# Patient Record
Sex: FEMALE | Race: WHITE | NOT HISPANIC OR LATINO | URBAN - METROPOLITAN AREA
[De-identification: names, ages, dates, MRNs, and addresses within clinical notes are randomized per-mention and may not be internally consistent; named-entity substitution may affect disease eponyms.]

---

## 2018-11-18 ENCOUNTER — EMERGENCY (EMERGENCY)
Facility: HOSPITAL | Age: 68
LOS: 1 days | Discharge: HOME | End: 2018-11-18
Attending: EMERGENCY MEDICINE
Payer: MEDICARE

## 2018-11-18 VITALS
OXYGEN SATURATION: 100 % | DIASTOLIC BLOOD PRESSURE: 96 MMHG | TEMPERATURE: 98 F | RESPIRATION RATE: 17 BRPM | SYSTOLIC BLOOD PRESSURE: 171 MMHG | HEART RATE: 89 BPM | WEIGHT: 145.06 LBS

## 2018-11-18 VITALS
RESPIRATION RATE: 17 BRPM | SYSTOLIC BLOOD PRESSURE: 165 MMHG | OXYGEN SATURATION: 100 % | HEART RATE: 89 BPM | DIASTOLIC BLOOD PRESSURE: 83 MMHG | TEMPERATURE: 97 F

## 2018-11-18 LAB
ALBUMIN SERPL ELPH-MCNC: 3.8 G/DL — SIGNIFICANT CHANGE UP (ref 3.5–5)
ALP SERPL-CCNC: 49 U/L — SIGNIFICANT CHANGE UP (ref 40–120)
ALT FLD-CCNC: 38 U/L DA — SIGNIFICANT CHANGE UP (ref 10–60)
ANION GAP SERPL CALC-SCNC: 8 MMOL/L — SIGNIFICANT CHANGE UP (ref 5–17)
AST SERPL-CCNC: 15 U/L — SIGNIFICANT CHANGE UP (ref 10–40)
BILIRUB SERPL-MCNC: 0.2 MG/DL — SIGNIFICANT CHANGE UP (ref 0.2–1.2)
BUN SERPL-MCNC: 10 MG/DL — SIGNIFICANT CHANGE UP (ref 7–18)
CALCIUM SERPL-MCNC: 8.4 MG/DL — SIGNIFICANT CHANGE UP (ref 8.4–10.5)
CHLORIDE SERPL-SCNC: 98 MMOL/L — SIGNIFICANT CHANGE UP (ref 96–108)
CO2 SERPL-SCNC: 24 MMOL/L — SIGNIFICANT CHANGE UP (ref 22–31)
CREAT SERPL-MCNC: 0.67 MG/DL — SIGNIFICANT CHANGE UP (ref 0.5–1.3)
GLUCOSE SERPL-MCNC: 133 MG/DL — HIGH (ref 70–99)
HCT VFR BLD CALC: 37.9 % — SIGNIFICANT CHANGE UP (ref 34.5–45)
HGB BLD-MCNC: 12.9 G/DL — SIGNIFICANT CHANGE UP (ref 11.5–15.5)
MCHC RBC-ENTMCNC: 33.2 PG — SIGNIFICANT CHANGE UP (ref 27–34)
MCHC RBC-ENTMCNC: 34.1 GM/DL — SIGNIFICANT CHANGE UP (ref 32–36)
MCV RBC AUTO: 97.5 FL — SIGNIFICANT CHANGE UP (ref 80–100)
PLATELET # BLD AUTO: 418 K/UL — HIGH (ref 150–400)
POTASSIUM SERPL-MCNC: 4.1 MMOL/L — SIGNIFICANT CHANGE UP (ref 3.5–5.3)
POTASSIUM SERPL-SCNC: 4.1 MMOL/L — SIGNIFICANT CHANGE UP (ref 3.5–5.3)
PROT SERPL-MCNC: 6.6 G/DL — SIGNIFICANT CHANGE UP (ref 6–8.3)
RBC # BLD: 3.89 M/UL — SIGNIFICANT CHANGE UP (ref 3.8–5.2)
RBC # FLD: 11.3 % — SIGNIFICANT CHANGE UP (ref 10.3–14.5)
SODIUM SERPL-SCNC: 130 MMOL/L — LOW (ref 135–145)
TROPONIN I SERPL-MCNC: <0.015 NG/ML — SIGNIFICANT CHANGE UP (ref 0–0.04)
WBC # BLD: 13.7 K/UL — HIGH (ref 3.8–10.5)
WBC # FLD AUTO: 13.7 K/UL — HIGH (ref 3.8–10.5)

## 2018-11-18 PROCEDURE — 80053 COMPREHEN METABOLIC PANEL: CPT

## 2018-11-18 PROCEDURE — 99284 EMERGENCY DEPT VISIT MOD MDM: CPT | Mod: 25

## 2018-11-18 PROCEDURE — 71046 X-RAY EXAM CHEST 2 VIEWS: CPT | Mod: 26

## 2018-11-18 PROCEDURE — 93005 ELECTROCARDIOGRAM TRACING: CPT

## 2018-11-18 PROCEDURE — 85027 COMPLETE CBC AUTOMATED: CPT

## 2018-11-18 PROCEDURE — 71046 X-RAY EXAM CHEST 2 VIEWS: CPT

## 2018-11-18 PROCEDURE — 96374 THER/PROPH/DIAG INJ IV PUSH: CPT

## 2018-11-18 PROCEDURE — 84484 ASSAY OF TROPONIN QUANT: CPT

## 2018-11-18 PROCEDURE — 94640 AIRWAY INHALATION TREATMENT: CPT

## 2018-11-18 PROCEDURE — 99284 EMERGENCY DEPT VISIT MOD MDM: CPT

## 2018-11-18 RX ORDER — IPRATROPIUM/ALBUTEROL SULFATE 18-103MCG
3 AEROSOL WITH ADAPTER (GRAM) INHALATION ONCE
Qty: 0 | Refills: 0 | Status: COMPLETED | OUTPATIENT
Start: 2018-11-18 | End: 2018-11-18

## 2018-11-18 RX ORDER — NYSTATIN 500MM UNIT
4 POWDER (EA) MISCELLANEOUS
Qty: 240 | Refills: 0
Start: 2018-11-18

## 2018-11-18 RX ORDER — MAGNESIUM SULFATE 500 MG/ML
2 VIAL (ML) INJECTION ONCE
Qty: 0 | Refills: 0 | Status: COMPLETED | OUTPATIENT
Start: 2018-11-18 | End: 2018-11-18

## 2018-11-18 RX ORDER — EPINEPHRINE 0.3 MG/.3ML
0.3 INJECTION INTRAMUSCULAR; SUBCUTANEOUS ONCE
Qty: 0 | Refills: 0 | Status: COMPLETED | OUTPATIENT
Start: 2018-11-18 | End: 2018-11-18

## 2018-11-18 RX ORDER — ALBUTEROL 90 UG/1
2 AEROSOL, METERED ORAL
Qty: 1 | Refills: 0 | OUTPATIENT
Start: 2018-11-18

## 2018-11-18 RX ADMIN — Medication 3 MILLILITER(S): at 16:55

## 2018-11-18 RX ADMIN — EPINEPHRINE 0.3 MILLIGRAM(S): 0.3 INJECTION INTRAMUSCULAR; SUBCUTANEOUS at 17:54

## 2018-11-18 RX ADMIN — Medication 2 GRAM(S): at 15:15

## 2018-11-18 RX ADMIN — Medication 3 MILLILITER(S): at 16:53

## 2018-11-18 RX ADMIN — Medication 3 MILLILITER(S): at 15:13

## 2018-11-18 RX ADMIN — Medication 50 GRAM(S): at 15:12

## 2018-11-18 NOTE — ED ADULT TRIAGE NOTE - CHIEF COMPLAINT QUOTE
shortness of breath x 10 days. atient completed ABX. on prednisone medication, (+) wheezing noted in triage

## 2018-11-18 NOTE — ED PROVIDER NOTE - MEDICAL DECISION MAKING DETAILS
69 yo f w/ aforementioned presentation concerning for asthma exacerbation but will r/o PNA and fluid overload w/ labs and CXR and treat w/ steroids, magnesium, duonebs and reassess.  Pt attempted to leave AMA but still tachypneic w/ wheezing. Pt refuses to be admitted to the hospital for status asthmaticus. After discussion with the patient she reports that in the past epinephrine usually resolves her symptoms and she insists that amoxicillin is also a reliable treatment for her bronchitis instead of the antibiotic she was prescribed at [facility]. Pt agrees to stay for monitoring after epinephrine and if she has not improved she will reconsider admission.  Pt reassessed and lungs clear to auscultation b/l w/ no further wheezing. Pt improved for dc home and instructions to follow up with her PMD.

## 2018-11-18 NOTE — ED PROVIDER NOTE - OBJECTIVE STATEMENT
69 yo f w/ PMH HTN and lifelong asthmatic c/o dyspnea x 3 days. Pt reports she was diagnosed w/ bronchitis 2 weeks ago at [facility] for which she was prescribed prednisone and an unknown antibiotic. Her cough persisted and since Friday she developed worsened dyspnea similar to prior asthma exacerbations. She denies any CP, LE swelling or pain, recent travel, history of PE/DVT, f/c/ns, or any other complaints.

## 2018-11-18 NOTE — ED PROVIDER NOTE - PHYSICAL EXAMINATION
General: pt lying in stretcher, appears stated age and is tachypneic but speaking in full clear sentences  HEENT: AT/NC, pink conjunctiva, anicteric sclerae, EOMI, PERRLA, nasal mucosa pink, no discharge, turbinates not enlarged; moist mucus membranes, tongue well-papillated, good dentition; posterior pharynx shows no erythema or exudates;   Neck: supple, full ROM, trachea midline, no JVD, no cervical LAD, no midline ttp or stepoffs  Lungs: b/l wheezes, no crackles, or rhonchi  Heart: rrr, S1, S2 normal; no S3 or S4; no murmurs or rubs  Abd: normal bowel sounds; soft, nontender; negative McBurney's point tenderness, negative Payton's sign, no rebound, no guarding, spleen non-palpable; no hepatomegaly, no masses  Back: no midline spinal tenderness or stepoffs, no costovertebral angle tenderness  Extremities: no clubbing, cyanosis, or edema; no palpable deformities or fractures, no calf tenderness, negative Carlos A's sign  Skin: good turgor; no rashes, petechiae, ecchymoses, or jaundice  Pulses: radial, posterior tibialis (PT), dorsalis pedis (DP) all 2+ & symmetric  Neuro: awake, alert, responsive; oriented to person, place and time; cranial nerves intact, EOMI, intact jaw movement, intact facial sensation, no facial asymmetry, hearing intact; no nystagmus, tongue midline; Motor: Normal tone in upper and lower extremities bilaterally strength 5/5; Sensory: intact; normal steady gait

## 2019-02-27 NOTE — ED ADULT NURSE NOTE - CHIEF COMPLAINT
In order to meet Medicare requirements, the clinical documentation must support the information cited in the admission order.  Please be sure to provide detailed and clear documentation about the following in the admitting note/history and physical: The patient is a 68y Female complaining of shortness of breath.

## 2019-05-27 ENCOUNTER — EMERGENCY (EMERGENCY)
Facility: HOSPITAL | Age: 69
LOS: 1 days | Discharge: HOME | End: 2019-05-27
Attending: EMERGENCY MEDICINE
Payer: MEDICARE

## 2019-05-27 VITALS
DIASTOLIC BLOOD PRESSURE: 80 MMHG | RESPIRATION RATE: 17 BRPM | SYSTOLIC BLOOD PRESSURE: 164 MMHG | OXYGEN SATURATION: 99 % | HEART RATE: 101 BPM

## 2019-05-27 VITALS
OXYGEN SATURATION: 98 % | RESPIRATION RATE: 18 BRPM | TEMPERATURE: 98 F | SYSTOLIC BLOOD PRESSURE: 166 MMHG | WEIGHT: 149.91 LBS | HEART RATE: 92 BPM | DIASTOLIC BLOOD PRESSURE: 92 MMHG | HEIGHT: 65 IN

## 2019-05-27 PROBLEM — J45.901 UNSPECIFIED ASTHMA WITH (ACUTE) EXACERBATION: Chronic | Status: ACTIVE | Noted: 2018-12-06

## 2019-05-27 PROCEDURE — 73610 X-RAY EXAM OF ANKLE: CPT | Mod: 26,LT

## 2019-05-27 PROCEDURE — 99283 EMERGENCY DEPT VISIT LOW MDM: CPT | Mod: 25

## 2019-05-27 PROCEDURE — 73590 X-RAY EXAM OF LOWER LEG: CPT | Mod: 26,LT

## 2019-05-27 PROCEDURE — 29515 APPLICATION SHORT LEG SPLINT: CPT | Mod: LT

## 2019-05-27 RX ORDER — IBUPROFEN 200 MG
600 TABLET ORAL ONCE
Refills: 0 | Status: COMPLETED | OUTPATIENT
Start: 2019-05-27 | End: 2019-05-27

## 2019-05-27 NOTE — ED PROVIDER NOTE - PHYSICAL EXAMINATION
MSK: left ankle ttp, bruising over medial and lateral maleoli.   cap refill of toes wnl. normal pedal pulses.

## 2019-05-27 NOTE — ED PROVIDER NOTE - PMH
Hypertension    Moderate asthma with acute exacerbation, unspecified whether persistent    Trigeminal neuralgia

## 2019-05-27 NOTE — ED PROVIDER NOTE - RESPIRATORY NEGATIVE STATEMENT, MLM
no chest pain, no cough, and no shortness of breath. Alternatives Discussed Intro (Do Not Add Period): I discussed alternative treatments to Mohs surgery and specifically discussed the risks and benefits of

## 2019-05-27 NOTE — ED ADULT NURSE REASSESSMENT NOTE - NS ED NURSE REASSESS COMMENT FT1
Pt seen and evaluated by  DR Garcia advised to stay in hospital, however Pt not willing to be admitted, elías provided instructions given Pt verbalized understanding

## 2019-05-27 NOTE — ED ADULT NURSE NOTE - OBJECTIVE STATEMENT
presented S/P fall c/o  Lt ankle pain and swelling BIB EMS  S/P fall c/o  Lt ankle pain and swelling and deformity no LOC noted

## 2019-05-27 NOTE — ED ADULT TRIAGE NOTE - NSWEIGHTCALCTOOLDRUG_GEN_A_CORE
DATE:  11/8/2018   TIME OF RECEIPT FROM LAB:  9744  LAB TEST:  Glucose  LAB VALUE:  49   RESULTS GIVEN WITH READ-BACK TO (PROVIDER): Jane BAJWA LAB VALUE REPORTED TO PROVIDER:   1477      used

## 2019-05-27 NOTE — ED PROVIDER NOTE - PROGRESS NOTE DETAILS
jaden fx, attempted multiple times to reach Dr. Figueroa, cell phone giving busy tone, no ortho pa on duty due to holiday. splinted. pain controlled. jaden aleman, discussed findings with pt. splinted with both saddle and posterior splint. gave and showed how to use crutches. pt not in pain now. advised to admit but pt insists on going home because she left her dog in a crate.

## 2019-05-27 NOTE — ED PROVIDER NOTE - OBJECTIVE STATEMENT
69F with h/o HTN, trigeminal neuralgia, was walking down steps at 2 pm when she twisted her left ankle and fell. Was not able to walk. No head strike or LOC. Not taking any AC. Called EMS and was brought to the ED. Not in pain unless moves. No broken skin.

## 2019-05-27 NOTE — ED ADULT NURSE NOTE - NSIMPLEMENTINTERV_GEN_ALL_ED
Implemented All Universal Safety Interventions:  [place] to call system. Call bell, personal items and telephone within reach. Instruct patient to call for assistance. Room bathroom lighting operational. Non-slip footwear when patient is off stretcher. Physically safe environment: no spills, clutter or unnecessary equipment. Stretcher in lowest position, wheels locked, appropriate side rails in place.

## 2019-05-28 ENCOUNTER — INPATIENT (INPATIENT)
Facility: HOSPITAL | Age: 69
LOS: 4 days | Discharge: NURSING, REHAB OR LONG-TERM CARE | DRG: 494 | End: 2019-06-02
Attending: ORTHOPAEDIC SURGERY | Admitting: ORTHOPAEDIC SURGERY
Payer: MEDICARE

## 2019-05-28 VITALS
TEMPERATURE: 98 F | HEART RATE: 78 BPM | OXYGEN SATURATION: 99 % | DIASTOLIC BLOOD PRESSURE: 67 MMHG | RESPIRATION RATE: 17 BRPM | SYSTOLIC BLOOD PRESSURE: 132 MMHG

## 2019-05-28 DIAGNOSIS — G50.0 TRIGEMINAL NEURALGIA: ICD-10-CM

## 2019-05-28 DIAGNOSIS — Z29.9 ENCOUNTER FOR PROPHYLACTIC MEASURES, UNSPECIFIED: ICD-10-CM

## 2019-05-28 DIAGNOSIS — Z01.818 ENCOUNTER FOR OTHER PREPROCEDURAL EXAMINATION: ICD-10-CM

## 2019-05-28 DIAGNOSIS — S82.852A DISPLACED TRIMALLEOLAR FRACTURE OF LEFT LOWER LEG, INITIAL ENCOUNTER FOR CLOSED FRACTURE: ICD-10-CM

## 2019-05-28 DIAGNOSIS — S82.853A DISPLACED TRIMALLEOLAR FRACTURE OF UNSPECIFIED LOWER LEG, INITIAL ENCOUNTER FOR CLOSED FRACTURE: ICD-10-CM

## 2019-05-28 DIAGNOSIS — I10 ESSENTIAL (PRIMARY) HYPERTENSION: ICD-10-CM

## 2019-05-28 DIAGNOSIS — J45.909 UNSPECIFIED ASTHMA, UNCOMPLICATED: ICD-10-CM

## 2019-05-28 LAB
ANION GAP SERPL CALC-SCNC: 11 MMOL/L — SIGNIFICANT CHANGE UP (ref 5–17)
APTT BLD: 28.8 SEC — SIGNIFICANT CHANGE UP (ref 27.5–36.3)
BASOPHILS # BLD AUTO: 0.04 K/UL — SIGNIFICANT CHANGE UP (ref 0–0.2)
BASOPHILS NFR BLD AUTO: 0.3 % — SIGNIFICANT CHANGE UP (ref 0–2)
BUN SERPL-MCNC: 20 MG/DL — HIGH (ref 7–18)
CALCIUM SERPL-MCNC: 9.7 MG/DL — SIGNIFICANT CHANGE UP (ref 8.4–10.5)
CHLORIDE SERPL-SCNC: 100 MMOL/L — SIGNIFICANT CHANGE UP (ref 96–108)
CO2 SERPL-SCNC: 26 MMOL/L — SIGNIFICANT CHANGE UP (ref 22–31)
CREAT SERPL-MCNC: 0.78 MG/DL — SIGNIFICANT CHANGE UP (ref 0.5–1.3)
EOSINOPHIL # BLD AUTO: 0.07 K/UL — SIGNIFICANT CHANGE UP (ref 0–0.5)
EOSINOPHIL NFR BLD AUTO: 0.6 % — SIGNIFICANT CHANGE UP (ref 0–6)
GLUCOSE SERPL-MCNC: 82 MG/DL — SIGNIFICANT CHANGE UP (ref 70–99)
HCT VFR BLD CALC: 40.7 % — SIGNIFICANT CHANGE UP (ref 34.5–45)
HGB BLD-MCNC: 13.4 G/DL — SIGNIFICANT CHANGE UP (ref 11.5–15.5)
IMM GRANULOCYTES NFR BLD AUTO: 2 % — HIGH (ref 0–1.5)
INR BLD: 1.15 RATIO — SIGNIFICANT CHANGE UP (ref 0.88–1.16)
LYMPHOCYTES # BLD AUTO: 19.2 % — SIGNIFICANT CHANGE UP (ref 13–44)
LYMPHOCYTES # BLD AUTO: 2.39 K/UL — SIGNIFICANT CHANGE UP (ref 1–3.3)
MCHC RBC-ENTMCNC: 32.9 GM/DL — SIGNIFICANT CHANGE UP (ref 32–36)
MCHC RBC-ENTMCNC: 33.2 PG — SIGNIFICANT CHANGE UP (ref 27–34)
MCV RBC AUTO: 100.7 FL — HIGH (ref 80–100)
MONOCYTES # BLD AUTO: 1.01 K/UL — HIGH (ref 0–0.9)
MONOCYTES NFR BLD AUTO: 8.1 % — SIGNIFICANT CHANGE UP (ref 2–14)
NEUTROPHILS # BLD AUTO: 8.69 K/UL — HIGH (ref 1.8–7.4)
NEUTROPHILS NFR BLD AUTO: 69.8 % — SIGNIFICANT CHANGE UP (ref 43–77)
NRBC # BLD: 0 /100 WBCS — SIGNIFICANT CHANGE UP (ref 0–0)
PLATELET # BLD AUTO: 399 K/UL — SIGNIFICANT CHANGE UP (ref 150–400)
POTASSIUM SERPL-MCNC: 3.3 MMOL/L — LOW (ref 3.5–5.3)
POTASSIUM SERPL-SCNC: 3.3 MMOL/L — LOW (ref 3.5–5.3)
PROTHROM AB SERPL-ACNC: 12.8 SEC — SIGNIFICANT CHANGE UP (ref 10–12.9)
RBC # BLD: 4.04 M/UL — SIGNIFICANT CHANGE UP (ref 3.8–5.2)
RBC # FLD: 14.9 % — HIGH (ref 10.3–14.5)
SODIUM SERPL-SCNC: 137 MMOL/L — SIGNIFICANT CHANGE UP (ref 135–145)
WBC # BLD: 12.45 K/UL — HIGH (ref 3.8–10.5)
WBC # FLD AUTO: 12.45 K/UL — HIGH (ref 3.8–10.5)

## 2019-05-28 PROCEDURE — 93010 ELECTROCARDIOGRAM REPORT: CPT

## 2019-05-28 PROCEDURE — 73600 X-RAY EXAM OF ANKLE: CPT | Mod: 26,LT

## 2019-05-28 PROCEDURE — 71045 X-RAY EXAM CHEST 1 VIEW: CPT | Mod: 26

## 2019-05-28 PROCEDURE — 99283 EMERGENCY DEPT VISIT LOW MDM: CPT

## 2019-05-28 RX ORDER — ACETAMINOPHEN 500 MG
1000 TABLET ORAL ONCE
Refills: 0 | Status: COMPLETED | OUTPATIENT
Start: 2019-05-28 | End: 2019-05-28

## 2019-05-28 RX ORDER — ALBUTEROL 90 UG/1
2 AEROSOL, METERED ORAL EVERY 6 HOURS
Refills: 0 | Status: DISCONTINUED | OUTPATIENT
Start: 2019-05-28 | End: 2019-05-30

## 2019-05-28 RX ORDER — CARBAMAZEPINE 200 MG
200 TABLET ORAL DAILY
Refills: 0 | Status: DISCONTINUED | OUTPATIENT
Start: 2019-05-28 | End: 2019-05-28

## 2019-05-28 RX ORDER — ENOXAPARIN SODIUM 100 MG/ML
40 INJECTION SUBCUTANEOUS EVERY 24 HOURS
Refills: 0 | Status: DISCONTINUED | OUTPATIENT
Start: 2019-05-28 | End: 2019-05-30

## 2019-05-28 RX ADMIN — Medication 10 MILLIGRAM(S): at 22:38

## 2019-05-28 RX ADMIN — ALBUTEROL 2 PUFF(S): 90 AEROSOL, METERED ORAL at 23:06

## 2019-05-28 NOTE — H&P ADULT - HISTORY OF PRESENT ILLNESS
69y/oF presents today with increasing pain in left ankle s/p fall yesterday. Patient states she was walking in her backyard when she turned her ankle inward and fell. Patient had immediate pain at left ankle which is generalized and nonradiating. Movement worsens pain. Relieved with pain medication. Patient states she came to ED yesterday where xrays were taken. She was told by ED physician she had left ankle fracture and was splinted and recommended surgery. She had went home after because she said she had to take care of her pet. She came back to ED today for increased pain and unable to ambulate at home. She states she has been crawling on the floor at home. Denies any CP, SOB, paresthesias. Denies any H/A, LOC, dizziness.

## 2019-05-28 NOTE — ED PROVIDER NOTE - OBJECTIVE STATEMENT
70 y/o F with a significant PMHx of HTN, asthma and trigeminal neuralgia presents to the ED with complaints of left ankle fracture. Patient seen in ER yesterday and was diagnosed with trimalleolar fracture. Patient reports having to go home to care for pet. Patient states she was having difficulty at home, notes having to crawl on floor. Patient is back for admission and possible surgery.

## 2019-05-28 NOTE — H&P ADULT - NSHPLABSRESULTS_GEN_ALL_CORE
< from: Xray Ankle Complete 3 Views, Left (05.27.19 @ 15:57) >      EXAM:  LEG AP&LAT - LEFT                          EXAM:  ANKLE LEFT (MINIMUM 3 V)                            PROCEDURE DATE:  05/27/2019          INTERPRETATION:  X-rays of the left ankle and the left lower leg    Indication: Left leg pain from a fall.    3 views of the left ankle and 2 views of the left lower leg are acquired   without a previous examination for comparison.    Impression: Acute comminuted distal fibular fracture.     Acute displaced medial malleolar fracture.    Subluxation atthe left ankle.    The osseous mineralization is within normal limits.    Findings are communicated with the emergency department via the PACS   communication system.                 CHAZ WANG M.D., ATTENDING RADIOLOGIST  This document has been electronically signed. May 27 2019  4:59PM    < end of copied text >

## 2019-05-28 NOTE — H&P ADULT - NSICDXPASTMEDICALHX_GEN_ALL_CORE_FT
PAST MEDICAL HISTORY:  Hypertension     Moderate asthma with acute exacerbation, unspecified whether persistent     Trigeminal neuralgia

## 2019-05-28 NOTE — CONSULT NOTE ADULT - ASSESSMENT
70 y/o F with PMH of HTN, Asthma and Trigeminal neuralgia and no significant PSH presents today with increasing pain in left ankle s/p fall yesterday. Admitted to orthopedic service for left ankle fracture, s/p closed reduction with splint application, plan for ORIF. Medical team consulted for pre operative clearance.

## 2019-05-28 NOTE — ED PROVIDER NOTE - CLINICAL SUMMARY MEDICAL DECISION MAKING FREE TEXT BOX
70 y/o F presents to the ED with trimalleolar fracture. Will consult orthopedics for likely admission and possible surgery.

## 2019-05-28 NOTE — H&P ADULT - NSHPPHYSICALEXAM_GEN_ALL_CORE
Left Lower Extremity: Prior splint from yesterday ED visit removed. Ecchymosis seen at medial and lateral aspect of left ankle. Swelling to left ankle. Fracture blister intact on medial aspect about [id number]. Deformity noted. Sensation intact. Calves soft and NT B/L. 1+pulses distally. ROM of toes B/L. Decreased ROM of left ankle due to severe pain.

## 2019-05-28 NOTE — CONSULT NOTE ADULT - SUBJECTIVE AND OBJECTIVE BOX
Asked to see Patient for evaluation of     HPI:  69y/oF presents today with increasing pain in left ankle s/p fall yesterday. Patient states she was walking in her backyard when she turned her ankle inward and fell. Patient had immediate pain at left ankle which is generalized and nonradiating. Movement worsens pain. Relieved with pain medication. Patient states she came to ED yesterday where xrays were taken. She was told by ED physician she had left ankle fracture and was splinted and recommended surgery. She had went home after because she said she had to take care of her pet. She came back to ED today for increased pain and unable to ambulate at home. She states she has been crawling on the floor at home. Denies any CP, SOB, paresthesias. Denies any H/A, LOC, dizziness. (28 May 2019 17:22)      PAST MEDICAL & SURGICAL HISTORY:  Trigeminal neuralgia  Hypertension  Moderate asthma with acute exacerbation, unspecified whether persistent  No significant past surgical history      REVIEW OF SYSTEMS:    CONSTITUTIONAL: No fever, weight loss, or fatigue  EYES: No eye pain, visual disturbances, or discharge  ENMT:  No difficulty hearing, tinnitus, vertigo; No sinus or throat pain  NECK: No pain or stiffness  RESPIRATORY: No cough, wheezing, chills or hemoptysis; No shortness of breath  CARDIOVASCULAR: No chest pain, palpitations, dizziness, or leg swelling  GASTROINTESTINAL: No abdominal or epigastric pain. No nausea, vomiting, or hematemesis; No diarrhea or constipation. No melena or hematochezia.  GENITOURINARY: No dysuria, frequency, hematuria, or incontinence  NEUROLOGICAL: No headaches, memory loss, loss of strength, numbness, or tremors  SKIN: No itching, burning, rashes, or lesions   ENDOCRINE: No heat or cold intolerance; No hair loss  MUSCULOSKELETAL: No joint pain or swelling; No muscle, back, or extremity pain        MEDICATIONS  (STANDING):  enalapril 10 milliGRAM(s) Oral two times a day  enoxaparin Injectable 40 milliGRAM(s) SubCutaneous every 24 hours    MEDICATIONS  (PRN):  ALBUTerol    90 MICROgram(s) HFA Inhaler 2 Puff(s) Inhalation every 6 hours PRN Shortness of Breath and/or Wheezing      Allergies  No Known Allergies        SOCIAL HISTORY:  Social alcohol use, denies current tobacco or illicit drug use       FAMILY HISTORY:  Hear disease in mother,  at age of 63 years      Vital Signs Last 24 Hrs  T(C): 36.6 (28 May 2019 15:48), Max: 36.6 (28 May 2019 15:48)  T(F): 97.9 (28 May 2019 15:48), Max: 97.9 (28 May 2019 15:48)  HR: 78 (28 May 2019 15:48) (78 - 78)  BP: 132/67 (28 May 2019 15:48) (132/67 - 132/67)  BP(mean): --  RR: 17 (28 May 2019 15:48) (17 - 17)  SpO2: 99% (28 May 2019 15:48) (99% - 99%)    PHYSICAL EXAM:    GENERAL: NAD, well-groomed, well-developed  HEAD:  Atraumatic, Normocephalic  EYES: EOMI, PERRLA, conjunctiva and sclera clear  ENMT: No tonsillar erythema, exudates, or enlargement; Moist mucous membranes, Good dentition, No lesions  NECK: Supple, No JVD, Normal thyroid  CHEST/LUNG: Clear to percussion bilaterally; No rales, rhonchi, wheezing, or rubs  HEART: Regular rate and rhythm; No murmurs, rubs, or gallops  ABDOMEN: Soft, Nontender, Nondistended; Bowel sounds present  EXTREMITIES:  2+ Peripheral Pulses, No clubbing, cyanosis, or edema  NERVOUS SYSTEM:  Alert & Oriented X3, Good concentration; No gross focal deficit   SKIN: No rashes or lesions      LABS:                        13.4   12.45 )-----------( 399      ( 28 May 2019 17:03 )             40.7     05-    137  |  100  |  20<H>  ----------------------------<  82  3.3<L>   |  26  |  0.78    Ca    9.7      28 May 2019 17:03      PT/INR - ( 28 May 2019 17:03 )   PT: 12.8 sec;   INR: 1.15 ratio         PTT - ( 28 May 2019 17:03 )  PTT:28.8 sec      RADIOLOGY & ADDITIONAL STUDIES:    < from: Xray Tibia + Fibula 2 Views, Left (19 @ 15:58) >  Impression: Acute comminuted distal fibular fracture.     Acute displaced medial malleolar fracture.    Subluxation atthe left ankle.    The osseous mineralization is within normal limits.    < end of copied text >      EKG Reviewed: Asked to see Patient for evaluation of pre operative medical clearance.     HPI:  68 y/o F with PMH of HTN, Asthma and Trigeminal neuralgia and no significant PSH presents today with increasing pain in left ankle s/p fall yesterday. Patient states she was walking in her backyard when she turned her ankle inward and fell. Patient had immediate pain at left ankle which was generalized and non radiating. Movement worsens pain. Relieved with pain medication. Patient states she came to [facility] ED yesterday, x rays were taken and was told by ED physician she had left ankle fracture and was splinted and recommended surgery. She had went home after because she said she had to take care of her pet. She came back to ED today for increased pain and unable to ambulate at home. She states she has been crawling on the floor at home. Denies any CP, SOB, paresthesias. Denies any H/A, LOC, dizziness. (28 May 2019 17:22)      PAST MEDICAL & SURGICAL HISTORY:  Trigeminal neuralgia  Hypertension  Moderate asthma with acute exacerbation, unspecified whether persistent  No significant past surgical history      REVIEW OF SYSTEMS:    CONSTITUTIONAL: No fever, weight loss, or fatigue  EYES: No eye pain, visual disturbances, or discharge  ENMT:  No difficulty hearing, tinnitus, vertigo; No sinus or throat pain  NECK: No pain or stiffness  RESPIRATORY: No cough, wheezing, chills or hemoptysis; No shortness of breath  CARDIOVASCULAR: No chest pain, palpitations, dizziness, or leg swelling  GASTROINTESTINAL: No abdominal or epigastric pain. No nausea, vomiting, or hematemesis; No diarrhea or constipation. No melena or hematochezia.  GENITOURINARY: No dysuria, frequency, hematuria, or incontinence  NEUROLOGICAL: No headaches, memory loss, loss of strength, numbness, or tremors  SKIN: No itching, burning, rashes, or lesions   ENDOCRINE: No heat or cold intolerance; No hair loss  MUSCULOSKELETAL: Left ankle pain and limited movements secondary to pain        MEDICATIONS  (STANDING):  enalapril 10 milliGRAM(s) Oral two times a day  enoxaparin Injectable 40 milliGRAM(s) SubCutaneous every 24 hours    MEDICATIONS  (PRN):  ALBUTerol    90 MICROgram(s) HFA Inhaler 2 Puff(s) Inhalation every 6 hours PRN Shortness of Breath and/or Wheezing      Allergies  No Known Allergies        SOCIAL HISTORY:  Social alcohol use, denies current tobacco or illicit drug use       FAMILY HISTORY:  Hear disease in mother,  at age of 63 years      Vital Signs Last 24 Hrs  T(C): 36.6 (28 May 2019 15:48), Max: 36.6 (28 May 2019 15:48)  T(F): 97.9 (28 May 2019 15:48), Max: 97.9 (28 May 2019 15:48)  HR: 78 (28 May 2019 15:48) (78 - 78)  BP: 132/67 (28 May 2019 15:48) (132/67 - 132/67)  BP(mean): --  RR: 17 (28 May 2019 15:48) (17 - 17)  SpO2: 99% (28 May 2019 15:48) (99% - 99%)    PHYSICAL EXAM:    GENERAL: NAD, well-groomed, well-developed  HEAD:  Atraumatic, Normocephalic  EYES: EOMI, PERRLA, conjunctiva and sclera clear  ENMT: No tonsillar erythema, exudates, or enlargement; Moist mucous membranes  NECK: Supple  CHEST/LUNG: Clear to percussion bilaterally; No rales, rhonchi, wheezing, or rubs  HEART: Regular rate and rhythm; No murmurs, rubs, or gallops  ABDOMEN: Soft, Nontender, Nondistended; Bowel sounds present  EXTREMITIES: Left ankle wrapped and dressed with ace bandage;  2+ Peripheral Pulses, No clubbing, cyanosis, or edema in rt leg  NERVOUS SYSTEM:  Alert & Oriented X3, Good concentration; No gross focal deficit         LABS:                        13.4   12.45 )-----------( 399      ( 28 May 2019 17:03 )             40.7         137  |  100  |  20<H>  ----------------------------<  82  3.3<L>   |  26  |  0.78    Ca    9.7      28 May 2019 17:03      PT/INR - ( 28 May 2019 17:03 )   PT: 12.8 sec;   INR: 1.15 ratio         PTT - ( 28 May 2019 17:03 )  PTT:28.8 sec      RADIOLOGY & ADDITIONAL STUDIES:    < from: Xray Tibia + Fibula 2 Views, Left (19 @ 15:58) >  Impression: Acute comminuted distal fibular fracture.     Acute displaced medial malleolar fracture.    Subluxation atthe left ankle.    The osseous mineralization is within normal limits.    < end of copied text >      EKG Reviewed: Asked to see Patient for evaluation of pre operative medical clearance.     HPI:  68 y/o F with PMH of HTN, Asthma and Trigeminal neuralgia and no significant PSH presents today with increasing pain in left ankle s/p fall yesterday. Patient states she was walking in her backyard when she turned her ankle inward and fell. Patient had immediate pain at left ankle which was generalized and non radiating. Movement worsens pain. Relieved with pain medication. Patient states she came to [facility] ED yesterday, x rays were taken and was told by ED physician she had left ankle fracture and was splinted and recommended surgery. She had went home after because she said she had to take care of her pet. She came back to ED today for increased pain and unable to ambulate at home. She states she has been crawling on the floor at home. Denies any CP, SOB, paresthesias. Denies any H/A, LOC, dizziness. (28 May 2019 17:22)      PAST MEDICAL & SURGICAL HISTORY:  Trigeminal neuralgia  Hypertension  Moderate asthma with acute exacerbation, unspecified whether persistent  No significant past surgical history      REVIEW OF SYSTEMS:    CONSTITUTIONAL: No fever, weight loss, or fatigue  EYES: No eye pain, visual disturbances, or discharge  ENMT:  No difficulty hearing, tinnitus, vertigo; No sinus or throat pain  NECK: No pain or stiffness  RESPIRATORY: No cough, wheezing, chills or hemoptysis; No shortness of breath  CARDIOVASCULAR: No chest pain, palpitations, dizziness, or leg swelling  GASTROINTESTINAL: No abdominal or epigastric pain. No nausea, vomiting, or hematemesis; No diarrhea or constipation. No melena or hematochezia.  GENITOURINARY: No dysuria, frequency, hematuria, or incontinence  NEUROLOGICAL: No headaches, memory loss, loss of strength, numbness, or tremors  SKIN: No itching, burning, rashes, or lesions   ENDOCRINE: No heat or cold intolerance; No hair loss  MUSCULOSKELETAL: Left ankle pain and limited movements secondary to pain        MEDICATIONS  (STANDING):  enalapril 10 milliGRAM(s) Oral two times a day  enoxaparin Injectable 40 milliGRAM(s) SubCutaneous every 24 hours    MEDICATIONS  (PRN):  ALBUTerol    90 MICROgram(s) HFA Inhaler 2 Puff(s) Inhalation every 6 hours PRN Shortness of Breath and/or Wheezing      Allergies  No Known Allergies        SOCIAL HISTORY:  Social alcohol use, denies current tobacco or illicit drug use       FAMILY HISTORY:  Hear disease in mother,  at age of 63 years      Vital Signs Last 24 Hrs  T(C): 36.6 (28 May 2019 15:48), Max: 36.6 (28 May 2019 15:48)  T(F): 97.9 (28 May 2019 15:48), Max: 97.9 (28 May 2019 15:48)  HR: 78 (28 May 2019 15:48) (78 - 78)  BP: 132/67 (28 May 2019 15:48) (132/67 - 132/67)  BP(mean): --  RR: 17 (28 May 2019 15:48) (17 - 17)  SpO2: 99% (28 May 2019 15:48) (99% - 99%)    PHYSICAL EXAM:    GENERAL: NAD, well-groomed, well-developed  HEAD:  Atraumatic, Normocephalic  EYES: EOMI, PERRLA, conjunctiva and sclera clear  ENMT: No tonsillar erythema, exudates, or enlargement; Moist mucous membranes  NECK: Supple  CHEST/LUNG: Clear to percussion bilaterally; No rales, rhonchi, wheezing, or rubs  HEART: Regular rate and rhythm; No murmurs, rubs, or gallops  ABDOMEN: Soft, Nontender, Nondistended; Bowel sounds present  EXTREMITIES: Left ankle wrapped and dressed with ace bandage;  2+ Peripheral Pulses, No clubbing, cyanosis, or edema in rt leg  NERVOUS SYSTEM:  Alert & Oriented X3, Good concentration; No gross focal deficit         LABS:                        13.4   12.45 )-----------( 399      ( 28 May 2019 17:03 )             40.7         137  |  100  |  20<H>  ----------------------------<  82  3.3<L>   |  26  |  0.78    Ca    9.7      28 May 2019 17:03      PT/INR - ( 28 May 2019 17:03 )   PT: 12.8 sec;   INR: 1.15 ratio         PTT - ( 28 May 2019 17:03 )  PTT:28.8 sec      RADIOLOGY & ADDITIONAL STUDIES:    < from: Xray Tibia + Fibula 2 Views, Left (19 @ 15:58) >  Impression: Acute comminuted distal fibular fracture.     Acute displaced medial malleolar fracture.    Subluxation at the left ankle.    The osseous mineralization is within normal limits.    < end of copied text >      EKG Reviewed: NSR, no ST-T wave changes

## 2019-05-28 NOTE — H&P ADULT - PROBLEM SELECTOR PLAN 1
-pain management  -DVT prophylaxis  -Closed Reduction of left ankle fracture with AO splint application  -Post-reduction xray of left ankle  -Medical consult for clearance  -Recommend surgical intervention. For ORIF when medically cleared  -Case discussed with Dr. Figueroa

## 2019-05-29 LAB
ANION GAP SERPL CALC-SCNC: 6 MMOL/L — SIGNIFICANT CHANGE UP (ref 5–17)
BUN SERPL-MCNC: 13 MG/DL — SIGNIFICANT CHANGE UP (ref 7–18)
CALCIUM SERPL-MCNC: 8.5 MG/DL — SIGNIFICANT CHANGE UP (ref 8.4–10.5)
CHLORIDE SERPL-SCNC: 102 MMOL/L — SIGNIFICANT CHANGE UP (ref 96–108)
CO2 SERPL-SCNC: 25 MMOL/L — SIGNIFICANT CHANGE UP (ref 22–31)
CREAT SERPL-MCNC: 0.71 MG/DL — SIGNIFICANT CHANGE UP (ref 0.5–1.3)
GLUCOSE SERPL-MCNC: 100 MG/DL — HIGH (ref 70–99)
POTASSIUM SERPL-MCNC: 3.8 MMOL/L — SIGNIFICANT CHANGE UP (ref 3.5–5.3)
POTASSIUM SERPL-SCNC: 3.8 MMOL/L — SIGNIFICANT CHANGE UP (ref 3.5–5.3)
SODIUM SERPL-SCNC: 133 MMOL/L — LOW (ref 135–145)

## 2019-05-29 RX ORDER — CARBAMAZEPINE 200 MG
1 TABLET ORAL
Qty: 0 | Refills: 0 | DISCHARGE

## 2019-05-29 RX ORDER — CARBAMAZEPINE 200 MG
200 TABLET ORAL THREE TIMES A DAY
Refills: 0 | Status: DISCONTINUED | OUTPATIENT
Start: 2019-05-29 | End: 2019-05-29

## 2019-05-29 RX ORDER — SODIUM CHLORIDE 9 MG/ML
1000 INJECTION, SOLUTION INTRAVENOUS
Refills: 0 | Status: DISCONTINUED | OUTPATIENT
Start: 2019-05-29 | End: 2019-06-02

## 2019-05-29 RX ORDER — CETIRIZINE HYDROCHLORIDE 10 MG/1
10 TABLET ORAL DAILY
Refills: 0 | Status: DISCONTINUED | OUTPATIENT
Start: 2019-05-29 | End: 2019-06-02

## 2019-05-29 RX ORDER — CHLORHEXIDINE GLUCONATE 213 G/1000ML
1 SOLUTION TOPICAL DAILY
Refills: 0 | Status: DISCONTINUED | OUTPATIENT
Start: 2019-05-29 | End: 2019-06-02

## 2019-05-29 RX ORDER — FLUTICASONE PROPIONATE 220 MCG
2 AEROSOL WITH ADAPTER (GRAM) INHALATION
Refills: 0 | Status: DISCONTINUED | OUTPATIENT
Start: 2019-05-29 | End: 2019-06-02

## 2019-05-29 RX ORDER — ESOMEPRAZOLE MAGNESIUM 40 MG/1
20 CAPSULE, DELAYED RELEASE ORAL
Refills: 0 | Status: DISCONTINUED | OUTPATIENT
Start: 2019-05-29 | End: 2019-06-02

## 2019-05-29 RX ORDER — MONTELUKAST 4 MG/1
10 TABLET, CHEWABLE ORAL AT BEDTIME
Refills: 0 | Status: DISCONTINUED | OUTPATIENT
Start: 2019-05-29 | End: 2019-05-30

## 2019-05-29 RX ORDER — BUDESONIDE AND FORMOTEROL FUMARATE DIHYDRATE 160; 4.5 UG/1; UG/1
2 AEROSOL RESPIRATORY (INHALATION)
Refills: 0 | Status: DISCONTINUED | OUTPATIENT
Start: 2019-05-29 | End: 2019-05-29

## 2019-05-29 RX ORDER — CARBAMAZEPINE 200 MG
200 TABLET ORAL DAILY
Refills: 0 | Status: DISCONTINUED | OUTPATIENT
Start: 2019-05-29 | End: 2019-05-29

## 2019-05-29 RX ORDER — ESOMEPRAZOLE MAGNESIUM 40 MG/1
1 CAPSULE, DELAYED RELEASE ORAL
Qty: 0 | Refills: 0 | DISCHARGE

## 2019-05-29 RX ORDER — CARBAMAZEPINE 200 MG
200 TABLET ORAL THREE TIMES A DAY
Refills: 0 | Status: DISCONTINUED | OUTPATIENT
Start: 2019-05-29 | End: 2019-05-30

## 2019-05-29 RX ORDER — CETIRIZINE HYDROCHLORIDE 10 MG/1
1 TABLET ORAL
Qty: 0 | Refills: 0 | DISCHARGE

## 2019-05-29 RX ADMIN — CETIRIZINE HYDROCHLORIDE 10 MILLIGRAM(S): 10 TABLET ORAL at 11:29

## 2019-05-29 RX ADMIN — Medication 10 MILLIGRAM(S): at 11:28

## 2019-05-29 RX ADMIN — Medication 10 MILLIGRAM(S): at 21:12

## 2019-05-29 RX ADMIN — CHLORHEXIDINE GLUCONATE 1 APPLICATION(S): 213 SOLUTION TOPICAL at 10:28

## 2019-05-29 RX ADMIN — Medication 2 PUFF(S): at 22:44

## 2019-05-29 RX ADMIN — Medication 200 MILLIGRAM(S): at 21:18

## 2019-05-29 NOTE — PATIENT PROFILE ADULT - PRIMARY ROLES/RESPONSIBILITIES
Medication Requested:  Pravastatin and Lisinopril    Last Office Visit:  11/8/17    Next Office Visit:  No appointment scheduled    Last Refill:  5/1/17    Last Lab:  11/8/17    Medication refill request approved  
retired

## 2019-05-29 NOTE — CONSULT NOTE ADULT - SUBJECTIVE AND OBJECTIVE BOX
PULMONARY CONSULT NOTE      JANE BASHIR  MRN-[id number]    Patient is a 69y old  Female who presents with a chief complaint of Left Ankle Fracture (29 May 2019 09:00)      HISTORY OF PRESENT ILLNESS:  History of Present Illness:  Reason for Admission: Left Ankle Fracture	  History of Present Illness: 	  69y/oF presents today with increasing pain in left ankle s/p fall yesterday. Patient states she was walking in her backyard when she turned her ankle inward and fell. Patient had immediate pain at left ankle which is generalized and nonradiating. Movement worsens pain. Relieved with pain medication. Patient states she came to ED yesterday where xrays were taken. She was told by ED physician she had left ankle fracture and was splinted and recommended surgery. She had went home after because she said she had to take care of her pet. She came back to ED today for increased pain and unable to ambulate at home. She states she has been crawling on the floor at home. Denies any CP, SOB, paresthesias. Denies any H/A, LOC, dizziness.     Pt is awake, alert, lying in bed in NAD. Hx of Asthma. No recent PFTs. Uses Albuterol inhaler PRN.     MEDICATIONS  (STANDING):  carBAMazepine 200 milliGRAM(s) Oral three times a day  cetirizine 10 milliGRAM(s) Oral daily  chlorhexidine 2% Cloths 1 Application(s) Topical daily  dextrose 5% + sodium chloride 0.9%. 1000 milliLiter(s) (100 mL/Hr) IV Continuous <Continuous>  enalapril 10 milliGRAM(s) Oral two times a day  enoxaparin Injectable 40 milliGRAM(s) SubCutaneous every 24 hours  esOMEPRAZOLE 20 milliGRAM(s) Oral before breakfast      MEDICATIONS  (PRN):  Ashtiegjqpwbg214rv/Cnxpxtzapeocufq64.5mg 1 Tablet(s) 1 Tablet(s) Oral every 6 hours PRN mild pain  ALBUTerol    90 MICROgram(s) HFA Inhaler 2 Puff(s) Inhalation every 6 hours PRN Shortness of Breath and/or Wheezing      Allergies    No Known Allergies    Intolerances        PAST MEDICAL & SURGICAL HISTORY:  Trigeminal neuralgia  Hypertension  Moderate asthma with acute exacerbation, unspecified whether persistent  No significant past surgical history      FAMILY HISTORY:      SOCIAL HISTORY  Smoking History:     REVIEW OF SYSTEMS:    CONSTITUTIONAL:  No fevers, chills, sweats    HEENT:  Eyes:  No diplopia or blurred vision. ENT:  No earache, sore throat or runny nose.    CARDIOVASCULAR:  No pressure, squeezing, tightness, or heaviness about the chest; no palpitations.    RESPIRATORY:  Per HPI    GASTROINTESTINAL:  No abdominal pain, nausea, vomiting or diarrhea.    GENITOURINARY:  No dysuria, frequency or urgency.    NEUROLOGIC:  No paresthesias, fasciculations, seizures or weakness.    PSYCHIATRIC:  No disorder of thought or mood.    Vital Signs Last 24 Hrs  T(C): 36.5 (29 May 2019 05:07), Max: 36.7 (29 May 2019 01:01)  T(F): 97.7 (29 May 2019 05:07), Max: 98 (29 May 2019 01:01)  HR: 97 (29 May 2019 05:07) (78 - 98)  BP: 127/44 (29 May 2019 05:07) (127/44 - 164/66)  BP(mean): --  RR: 16 (29 May 2019 05:07) (16 - 17)  SpO2: 99% (29 May 2019 05:07) (98% - 99%)  I&O's Detail      PHYSICAL EXAMINATION:    GENERAL: The patient is a well-developed, well-nourished no apparent distress.     HEENT: Head is normocephalic and atraumatic. Extraocular muscles are intact. Mucous membranes are moist.     NECK: Supple.     LUNGS: Clear to auscultation without wheezing, rales, or rhonchi. Respirations unlabored    HEART: Regular rate and rhythm without murmur.    ABDOMEN: Soft, nontender, and nondistended.  No hepatosplenomegaly is noted.    EXTREMITIES: Without any cyanosis, clubbing, rash, lesions or edema.    NEUROLOGIC: Grossly intact.      LABS:                        13.4   12.45 )-----------( 399      ( 28 May 2019 17:03 )             40.7     05-28    137  |  100  |  20<H>  ----------------------------<  82  3.3<L>   |  26  |  0.78    Ca    9.7      28 May 2019 17:03      PT/INR - ( 28 May 2019 17:03 )   PT: 12.8 sec;   INR: 1.15 ratio         PTT - ( 28 May 2019 17:03 )  PTT:28.8 sec                    MICROBIOLOGY:    RADIOLOGY & ADDITIONAL STUDIES:    CXR:  < from: Xray Chest 1 View AP/PA (05.28.19 @ 18:22) >  Impression: No evidence for acute pulmonary infiltrate, pleural effusion,   or pneumothorax.     Stable cardiac silhouette.     No pulmonary vascular congestion.    Central retrocardiac density, suggestive of a hiatal hernia.    < end of copied text >    Ct scan chest:    ekg;    echo: PULMONARY CONSULT NOTE      JANE BASHIR  MRN-[id number]    Patient is a 69y old  Female who presents with a chief complaint of Left Ankle Fracture (29 May 2019 09:00)      HISTORY OF PRESENT ILLNESS:  History of Present Illness:  Reason for Admission: Left Ankle Fracture	  History of Present Illness: 	  69y/oF presents today with increasing pain in left ankle s/p fall yesterday. Patient states she was walking in her backyard when she turned her ankle inward and fell. Patient had immediate pain at left ankle which is generalized and nonradiating. Movement worsens pain. Relieved with pain medication. Patient states she came to ED yesterday where xrays were taken. She was told by ED physician she had left ankle fracture and was splinted and recommended surgery. She had went home after because she said she had to take care of her pet. She came back to ED today for increased pain and unable to ambulate at home. She states she has been crawling on the floor at home. Denies any CP, SOB, paresthesias. Denies any H/A, LOC, dizziness.     Pt is awake, alert, lying in bed in NAD. Hx of Asthma. No recent PFTs. Uses Albuterol inhaler PRN, Flovent and Primatene as OP. Pt also uses epinephrine injections QD (as per patient).    MEDICATIONS  (STANDING):  carBAMazepine 200 milliGRAM(s) Oral three times a day  cetirizine 10 milliGRAM(s) Oral daily  chlorhexidine 2% Cloths 1 Application(s) Topical daily  dextrose 5% + sodium chloride 0.9%. 1000 milliLiter(s) (100 mL/Hr) IV Continuous <Continuous>  enalapril 10 milliGRAM(s) Oral two times a day  enoxaparin Injectable 40 milliGRAM(s) SubCutaneous every 24 hours  esOMEPRAZOLE 20 milliGRAM(s) Oral before breakfast      MEDICATIONS  (PRN):  Oiydnquyvxctb216fk/Uzuedndryskegio63.5mg 1 Tablet(s) 1 Tablet(s) Oral every 6 hours PRN mild pain  ALBUTerol    90 MICROgram(s) HFA Inhaler 2 Puff(s) Inhalation every 6 hours PRN Shortness of Breath and/or Wheezing      Allergies    No Known Allergies    Intolerances        PAST MEDICAL & SURGICAL HISTORY:  Trigeminal neuralgia  Hypertension  Moderate asthma with acute exacerbation, unspecified whether persistent  No significant past surgical history      FAMILY HISTORY:      SOCIAL HISTORY  Smoking History:     REVIEW OF SYSTEMS:    CONSTITUTIONAL:  No fevers, chills, sweats    HEENT:  Eyes:  No diplopia or blurred vision. ENT:  No earache, sore throat or runny nose.    CARDIOVASCULAR:  No pressure, squeezing, tightness, or heaviness about the chest; no palpitations.    RESPIRATORY:  Per HPI    GASTROINTESTINAL:  No abdominal pain, nausea, vomiting or diarrhea.    GENITOURINARY:  No dysuria, frequency or urgency.    NEUROLOGIC:  No paresthesias, fasciculations, seizures or weakness.    PSYCHIATRIC:  No disorder of thought or mood.    Vital Signs Last 24 Hrs  T(C): 36.5 (29 May 2019 05:07), Max: 36.7 (29 May 2019 01:01)  T(F): 97.7 (29 May 2019 05:07), Max: 98 (29 May 2019 01:01)  HR: 97 (29 May 2019 05:07) (78 - 98)  BP: 127/44 (29 May 2019 05:07) (127/44 - 164/66)  BP(mean): --  RR: 16 (29 May 2019 05:07) (16 - 17)  SpO2: 99% (29 May 2019 05:07) (98% - 99%)  I&O's Detail      PHYSICAL EXAMINATION:    GENERAL: The patient is a well-developed, well-nourished no apparent distress.     HEENT: Head is normocephalic and atraumatic. Extraocular muscles are intact. Mucous membranes are moist.     NECK: Supple.     LUNGS: Clear to auscultation without wheezing, rales, or rhonchi. Respirations unlabored    HEART: Regular rate and rhythm without murmur.    ABDOMEN: Soft, nontender, and nondistended.  No hepatosplenomegaly is noted.    EXTREMITIES: Without any cyanosis, clubbing, rash, lesions or edema.    NEUROLOGIC: Grossly intact.      LABS:                        13.4   12.45 )-----------( 399      ( 28 May 2019 17:03 )             40.7     05-28    137  |  100  |  20<H>  ----------------------------<  82  3.3<L>   |  26  |  0.78    Ca    9.7      28 May 2019 17:03      PT/INR - ( 28 May 2019 17:03 )   PT: 12.8 sec;   INR: 1.15 ratio         PTT - ( 28 May 2019 17:03 )  PTT:28.8 sec                    MICROBIOLOGY:    RADIOLOGY & ADDITIONAL STUDIES:    CXR:  < from: Xray Chest 1 View AP/PA (05.28.19 @ 18:22) >  Impression: No evidence for acute pulmonary infiltrate, pleural effusion,   or pneumothorax.     Stable cardiac silhouette.     No pulmonary vascular congestion.    Central retrocardiac density, suggestive of a hiatal hernia.    < end of copied text >    Ct scan chest:    ekg;    echo: PULMONARY CONSULT NOTE      JANE BASHIR  MRN-[id number]    Patient is a 69y old  Female who presents with a chief complaint of Left Ankle Fracture (29 May 2019 09:00)      HISTORY OF PRESENT ILLNESS:  History of Present Illness:  Reason for Admission: Left Ankle Fracture	  History of Present Illness: 	  69y/oF presents today with increasing pain in left ankle s/p fall yesterday. Patient states she was walking in her backyard when she turned her ankle inward and fell. Patient had immediate pain at left ankle which is generalized and nonradiating. Movement worsens pain. Relieved with pain medication. Patient states she came to ED yesterday where xrays were taken. She was told by ED physician she had left ankle fracture and was splinted and recommended surgery. She had went home after because she said she had to take care of her pet. She came back to ED today for increased pain and unable to ambulate at home. She states she has been crawling on the floor at home. Denies any CP, SOB, paresthesias. Denies any H/A, LOC, dizziness.     Pt is awake, alert, lying in bed in NAD. Hx of Asthma. No recent PFTs. Uses Albuterol inhaler PRN, Flovent and Primatene as OP. Pt also uses epinephrine injections QD (as per patient).    MEDICATIONS  (STANDING):  carBAMazepine 200 milliGRAM(s) Oral three times a day  cetirizine 10 milliGRAM(s) Oral daily  chlorhexidine 2% Cloths 1 Application(s) Topical daily  dextrose 5% + sodium chloride 0.9%. 1000 milliLiter(s) (100 mL/Hr) IV Continuous <Continuous>  enalapril 10 milliGRAM(s) Oral two times a day  enoxaparin Injectable 40 milliGRAM(s) SubCutaneous every 24 hours  esOMEPRAZOLE 20 milliGRAM(s) Oral before breakfast      MEDICATIONS  (PRN):  Qclycsivmsmhd412tr/Nirdepweflwpsyg75.5mg 1 Tablet(s) 1 Tablet(s) Oral every 6 hours PRN mild pain  ALBUTerol    90 MICROgram(s) HFA Inhaler 2 Puff(s) Inhalation every 6 hours PRN Shortness of Breath and/or Wheezing      Allergies    No Known Allergies    Intolerances        PAST MEDICAL & SURGICAL HISTORY:  Trigeminal neuralgia  Hypertension  Moderate asthma with acute exacerbation, unspecified whether persistent  No significant past surgical history      FAMILY HISTORY:      SOCIAL HISTORY  Smoking History:     REVIEW OF SYSTEMS:    CONSTITUTIONAL:  No fevers, chills, sweats    HEENT:  Eyes:  No diplopia or blurred vision. ENT:  No earache, sore throat or runny nose.    CARDIOVASCULAR:  No pressure, squeezing, tightness, or heaviness about the chest; no palpitations.    RESPIRATORY:  Per HPI    GASTROINTESTINAL:  No abdominal pain, nausea, vomiting or diarrhea.    GENITOURINARY:  No dysuria, frequency or urgency.    NEUROLOGIC:  No paresthesias, fasciculations, seizures or weakness.    PSYCHIATRIC:  No disorder of thought or mood.    Vital Signs Last 24 Hrs  T(C): 36.5 (29 May 2019 05:07), Max: 36.7 (29 May 2019 01:01)  T(F): 97.7 (29 May 2019 05:07), Max: 98 (29 May 2019 01:01)  HR: 97 (29 May 2019 05:07) (78 - 98)  BP: 127/44 (29 May 2019 05:07) (127/44 - 164/66)  BP(mean): --  RR: 16 (29 May 2019 05:07) (16 - 17)  SpO2: 99% (29 May 2019 05:07) (98% - 99%)  I&O's Detail      PHYSICAL EXAMINATION:    GENERAL: The patient is a well-developed, well-nourished no apparent distress.     HEENT: Head is normocephalic and atraumatic. Extraocular muscles are intact. Mucous membranes are moist.     NECK: Supple.     LUNGS: + wheezing, No rales, or rhonchi. Respirations unlabored    HEART: Regular rate and rhythm without murmur.    ABDOMEN: Soft, nontender, and nondistended.  No hepatosplenomegaly is noted.    EXTREMITIES: Without any cyanosis, clubbing, rash, lesions or edema.    NEUROLOGIC: Grossly intact.      LABS:                        13.4   12.45 )-----------( 399      ( 28 May 2019 17:03 )             40.7     05-28    137  |  100  |  20<H>  ----------------------------<  82  3.3<L>   |  26  |  0.78    Ca    9.7      28 May 2019 17:03      PT/INR - ( 28 May 2019 17:03 )   PT: 12.8 sec;   INR: 1.15 ratio         PTT - ( 28 May 2019 17:03 )  PTT:28.8 sec                    MICROBIOLOGY:    RADIOLOGY & ADDITIONAL STUDIES:    CXR:  < from: Xray Chest 1 View AP/PA (05.28.19 @ 18:22) >  Impression: No evidence for acute pulmonary infiltrate, pleural effusion,   or pneumothorax.     Stable cardiac silhouette.     No pulmonary vascular congestion.    Central retrocardiac density, suggestive of a hiatal hernia.    < end of copied text >    Ct scan chest:    ekg;    echo:

## 2019-05-29 NOTE — PROGRESS NOTE ADULT - SUBJECTIVE AND OBJECTIVE BOX
Pt Name: JANE BASHIR  MRN: [id number]    ORTHOPEDICS    Orthopedic diagnosis: Left ankle trimalleolar fracture, closed, initial encounter    69yFemaleHPI:  Admitted for left ankle fracture, acute, closed, initial encounter  Overnight nurse reports that pt did not want to take interchangeable hospital medications here on [facility].  Pt wishes to take her own home medications that she keeps at bedside and does not want to hand in her meds to nurse/staff.  Home medications were reviewed with patient at bedside and a concern was raised to patient regarding her abusing her "Primatene" medication. As per pt, she takes 12 tabs of Primatene every morning and every evening for her asthma and breathing. She has been taking this medication for 12 years and she confirmed that she is aware of what she is doing and willingly takes it this way "because she knows her own body and what works for her".    Today pt feels comfortable at bedside with minimal pain of left ankle, s/p splint placement in ER    T(C): 36.5 (05-29-19 @ 05:07), Max: 36.7 (05-29-19 @ 01:01)  HR: 97 (05-29-19 @ 05:07) (78 - 98)  BP: 127/44 (05-29-19 @ 05:07) (127/44 - 164/66)  RR: 16 (05-29-19 @ 05:07) (16 - 17)  SpO2: 99% (05-29-19 @ 05:07) (98% - 99%)    PHYSICAL EXAM:    Gen: well developed, well nourished, comfortable  Musculoskeletal:    [   ] RIGHT    [  X  ] LEFT       [   ] UPPER EXTREMITY   [ X  ] LOWER EXTREMITY  Left ankle AO splint intact  skin pink and warm throughout   dressing c/d/i  sensation intact throughout   motor 5/5 strength of toes  grossly aligned  good cap refill        LABS:                        13.4   12.45 )-----------( 399      ( 28 May 2019 17:03 )             40.7     05-28    137  |  100  |  20<H>  ----------------------------<  82  3.3<L>   |  26  |  0.78    Ca    9.7      28 May 2019 17:03      PT/INR - ( 28 May 2019 17:03 )   PT: 12.8 sec;   INR: 1.15 ratio           PTT - ( 28 May 2019 17:03 )  PTT:28.8 sec    IMPRESSION: Pt is a  69y Female with left ankle tri-malleolar frx  PLAN:  -  Recommendation: [  ] Conservative treatment   [ X ] Surgical treatment/fixation    > Surgeon: dr jose e barcenas    > Clearance: needs medical optimization    > Will put on schedule for tomorrow    > NPO p MN with ivf  -  Pain control  -  DVT prophylaxis with lovenox  -  Case d/w   -  Expressed concern for patient abusing her own medication. Pt states that she is aware and will not change her habits. She will continue taking her own medications. Pt Name: JANE BASHIR  MRN: [id number]    ORTHOPEDICS    Orthopedic diagnosis: Left ankle trimalleolar fracture, closed, initial encounter    69yFemaleHPI:  Admitted for left ankle fracture, acute, closed, initial encounter  Overnight nurse reports that pt did not want to take interchangeable hospital medications here on [facility].  Pt wishes to take her own home medications that she keeps at bedside and does not want to hand in her meds to nurse/staff.  Home medications were reviewed with patient at bedside and a concern was raised to patient regarding her abusing her "Primatene" medication. As per pt, she takes 12 tabs of Primatene every morning and 12 tabs every evening for her asthma and breathing. She has been taking this medication for 12 years and she confirmed that she is aware of what she is doing and willingly takes it this way "because she knows her own body and what works for her".    Today pt feels comfortable at bedside with minimal pain of left ankle, s/p splint placement in ER    T(C): 36.5 (05-29-19 @ 05:07), Max: 36.7 (05-29-19 @ 01:01)  HR: 97 (05-29-19 @ 05:07) (78 - 98)  BP: 127/44 (05-29-19 @ 05:07) (127/44 - 164/66)  RR: 16 (05-29-19 @ 05:07) (16 - 17)  SpO2: 99% (05-29-19 @ 05:07) (98% - 99%)    PHYSICAL EXAM:    Gen: well developed, well nourished, comfortable  Musculoskeletal:    [   ] RIGHT    [  X  ] LEFT       [   ] UPPER EXTREMITY   [ X  ] LOWER EXTREMITY  Left ankle AO splint intact  skin pink and warm throughout   dressing c/d/i  sensation intact throughout   motor 5/5 strength of toes  grossly aligned  good cap refill        LABS:                        13.4   12.45 )-----------( 399      ( 28 May 2019 17:03 )             40.7     05-28    137  |  100  |  20<H>  ----------------------------<  82  3.3<L>   |  26  |  0.78    Ca    9.7      28 May 2019 17:03      PT/INR - ( 28 May 2019 17:03 )   PT: 12.8 sec;   INR: 1.15 ratio           PTT - ( 28 May 2019 17:03 )  PTT:28.8 sec    IMPRESSION: Pt is a  69y Female with left ankle tri-malleolar frx  PLAN:  -  Recommendation: [  ] Conservative treatment   [ X ] Surgical treatment/fixation    > Surgeon: dr jose e barcenas    > Clearance: needs medical optimization    > Will put on schedule for tomorrow    > NPO p MN with ivf  -  Pain control  -  DVT prophylaxis with lovenox  -  Case d/w   -  Expressed concern for patient abusing her own medication. Pt states that she is aware and will not change her habits. She will continue taking her own medications.

## 2019-05-29 NOTE — PROGRESS NOTE ADULT - SUBJECTIVE AND OBJECTIVE BOX
Patient is a 69y old  Female who presents with a chief complaint of Left Ankle Fracture (29 May 2019 09:00)    pt seen in icu [  ], reg med floor [x   ], bed [ x ], chair at bedside [   ], a+o x3 [ x ], lethargic [  ],  nad [ x ]        Allergies    No Known Allergies        Vitals    T(F): 97.7 (05-29-19 @ 05:07), Max: 98 (05-29-19 @ 01:01)  HR: 97 (05-29-19 @ 05:07) (78 - 98)  BP: 127/44 (05-29-19 @ 05:07) (127/44 - 164/66)  RR: 16 (05-29-19 @ 05:07) (16 - 17)  SpO2: 99% (05-29-19 @ 05:07) (98% - 99%)  Wt(kg): --  CAPILLARY BLOOD GLUCOSE          Labs                          13.4   12.45 )-----------( 399      ( 28 May 2019 17:03 )             40.7       05-28    137  |  100  |  20<H>  ----------------------------<  82  3.3<L>   |  26  |  0.78    Ca    9.7      28 May 2019 17:03                  Radiology Results      Meds    MEDICATIONS  (STANDING):  carBAMazepine 200 milliGRAM(s) Oral three times a day  cetirizine 10 milliGRAM(s) Oral daily  chlorhexidine 2% Cloths 1 Application(s) Topical daily  dextrose 5% + sodium chloride 0.9%. 1000 milliLiter(s) (100 mL/Hr) IV Continuous <Continuous>  enalapril 10 milliGRAM(s) Oral two times a day  enoxaparin Injectable 40 milliGRAM(s) SubCutaneous every 24 hours  esOMEPRAZOLE 20 milliGRAM(s) Oral before breakfast      MEDICATIONS  (PRN):  Rdlagnbirktqk177fj/Mtfdxdtyhcinupt87.5mg 1 Tablet(s) 1 Tablet(s) Oral every 6 hours PRN mild pain  ALBUTerol    90 MICROgram(s) HFA Inhaler 2 Puff(s) Inhalation every 6 hours PRN Shortness of Breath and/or Wheezing      Physical Exam    Neuro :  no focal deficits  Respiratory: CTA B/L  CV: RRR, S1S2, no murmurs,   Abdominal: Soft, NT, ND +BS,  Extremities: No edema, + peripheral pulses, left leg splint    ASSESSMENT    Closed displaced trimalleolar fracture  Trigeminal neuralgia  Hypertension  Moderate asthma with acute exacerbation, unspecified whether persistent  No significant past surgical history      PLAN Patient is a 69y old  Female who presents with a chief complaint of Left Ankle Fracture (29 May 2019 09:00)    pt seen in icu [  ], reg med floor [x   ], bed [ x ], chair at bedside [   ], a+o x3 [ x ], lethargic [  ],  nad [ x ]        Allergies    No Known Allergies        Vitals    T(F): 97.7 (05-29-19 @ 05:07), Max: 98 (05-29-19 @ 01:01)  HR: 97 (05-29-19 @ 05:07) (78 - 98)  BP: 127/44 (05-29-19 @ 05:07) (127/44 - 164/66)  RR: 16 (05-29-19 @ 05:07) (16 - 17)  SpO2: 99% (05-29-19 @ 05:07) (98% - 99%)  Wt(kg): --  CAPILLARY BLOOD GLUCOSE          Labs                          13.4   12.45 )-----------( 399      ( 28 May 2019 17:03 )             40.7       05-28    137  |  100  |  20<H>  ----------------------------<  82  3.3<L>   |  26  |  0.78    Ca    9.7      28 May 2019 17:03                  Radiology Results      Meds    MEDICATIONS  (STANDING):  carBAMazepine 200 milliGRAM(s) Oral three times a day  cetirizine 10 milliGRAM(s) Oral daily  chlorhexidine 2% Cloths 1 Application(s) Topical daily  dextrose 5% + sodium chloride 0.9%. 1000 milliLiter(s) (100 mL/Hr) IV Continuous <Continuous>  enalapril 10 milliGRAM(s) Oral two times a day  enoxaparin Injectable 40 milliGRAM(s) SubCutaneous every 24 hours  esOMEPRAZOLE 20 milliGRAM(s) Oral before breakfast      MEDICATIONS  (PRN):  Mcfutnykmbzmr341wr/Vbquepcosstamwt33.5mg 1 Tablet(s) 1 Tablet(s) Oral every 6 hours PRN mild pain  ALBUTerol    90 MICROgram(s) HFA Inhaler 2 Puff(s) Inhalation every 6 hours PRN Shortness of Breath and/or Wheezing      Physical Exam    Neuro :  no focal deficits  Respiratory: CTA B/L  CV: RRR, S1S2, no murmurs,   Abdominal: Soft, NT, ND +BS,  Extremities: No edema, + peripheral pulses, left leg splint    ASSESSMENT    left Closed displaced trimalleolar fracture  h/o Trigeminal neuralgia  Hypertension  asthma         PLAN      pt to have Surgical orif in am  NPO p MN with ivf  cont Pain control  cont DVT prophylaxis with lovenox  pt with moderate risk for periop cardiac complications for the proposed surgical procedure  pulm cons noted  Bronchodilators   Add Symbicort 160mcg 2 Puff BID with spacer.   Add Singulair QHS.   PFTs as OP.  Clear at moderate risk from pulmonary standpoint for intended procedure.   cont current meds

## 2019-05-30 PROCEDURE — 27814 TREATMENT OF ANKLE FRACTURE: CPT | Mod: AS,LT

## 2019-05-30 RX ORDER — CEFAZOLIN SODIUM 1 G
2000 VIAL (EA) INJECTION EVERY 8 HOURS
Refills: 0 | Status: COMPLETED | OUTPATIENT
Start: 2019-05-31 | End: 2019-05-31

## 2019-05-30 RX ORDER — CARBAMAZEPINE 200 MG
200 TABLET ORAL THREE TIMES A DAY
Refills: 0 | Status: DISCONTINUED | OUTPATIENT
Start: 2019-05-30 | End: 2019-06-02

## 2019-05-30 RX ORDER — MONTELUKAST 4 MG/1
10 TABLET, CHEWABLE ORAL AT BEDTIME
Refills: 0 | Status: DISCONTINUED | OUTPATIENT
Start: 2019-05-30 | End: 2019-06-02

## 2019-05-30 RX ORDER — ALBUTEROL 90 UG/1
2 AEROSOL, METERED ORAL EVERY 6 HOURS
Refills: 0 | Status: DISCONTINUED | OUTPATIENT
Start: 2019-05-30 | End: 2019-06-02

## 2019-05-30 RX ORDER — DOCUSATE SODIUM 100 MG
100 CAPSULE ORAL THREE TIMES A DAY
Refills: 0 | Status: DISCONTINUED | OUTPATIENT
Start: 2019-05-30 | End: 2019-06-02

## 2019-05-30 RX ORDER — SODIUM CHLORIDE 9 MG/ML
1000 INJECTION INTRAMUSCULAR; INTRAVENOUS; SUBCUTANEOUS
Refills: 0 | Status: DISCONTINUED | OUTPATIENT
Start: 2019-05-30 | End: 2019-06-02

## 2019-05-30 RX ORDER — OXYCODONE HYDROCHLORIDE 5 MG/1
5 TABLET ORAL EVERY 4 HOURS
Refills: 0 | Status: DISCONTINUED | OUTPATIENT
Start: 2019-05-30 | End: 2019-05-31

## 2019-05-30 RX ORDER — ENOXAPARIN SODIUM 100 MG/ML
40 INJECTION SUBCUTANEOUS EVERY 24 HOURS
Refills: 0 | Status: DISCONTINUED | OUTPATIENT
Start: 2019-05-30 | End: 2019-06-02

## 2019-05-30 RX ORDER — HYDROMORPHONE HYDROCHLORIDE 2 MG/ML
0.5 INJECTION INTRAMUSCULAR; INTRAVENOUS; SUBCUTANEOUS EVERY 6 HOURS
Refills: 0 | Status: DISCONTINUED | OUTPATIENT
Start: 2019-05-30 | End: 2019-05-31

## 2019-05-30 RX ORDER — SODIUM CHLORIDE 9 MG/ML
1000 INJECTION, SOLUTION INTRAVENOUS
Refills: 0 | Status: DISCONTINUED | OUTPATIENT
Start: 2019-05-30 | End: 2019-05-30

## 2019-05-30 RX ORDER — ACETAMINOPHEN 500 MG
325 TABLET ORAL EVERY 4 HOURS
Refills: 0 | Status: DISCONTINUED | OUTPATIENT
Start: 2019-05-30 | End: 2019-06-02

## 2019-05-30 RX ORDER — PANTOPRAZOLE SODIUM 20 MG/1
40 TABLET, DELAYED RELEASE ORAL
Refills: 0 | Status: DISCONTINUED | OUTPATIENT
Start: 2019-05-30 | End: 2019-05-30

## 2019-05-30 RX ORDER — ONDANSETRON 8 MG/1
4 TABLET, FILM COATED ORAL EVERY 6 HOURS
Refills: 0 | Status: DISCONTINUED | OUTPATIENT
Start: 2019-05-30 | End: 2019-06-02

## 2019-05-30 RX ADMIN — ESOMEPRAZOLE MAGNESIUM 20 MILLIGRAM(S): 40 CAPSULE, DELAYED RELEASE ORAL at 07:31

## 2019-05-30 RX ADMIN — Medication 10 MILLIGRAM(S): at 21:34

## 2019-05-30 RX ADMIN — Medication 200 MILLIGRAM(S): at 22:30

## 2019-05-30 RX ADMIN — SODIUM CHLORIDE 100 MILLILITER(S): 9 INJECTION, SOLUTION INTRAVENOUS at 00:59

## 2019-05-30 RX ADMIN — Medication 10 MILLIGRAM(S): at 07:25

## 2019-05-30 RX ADMIN — Medication 200 MILLIGRAM(S): at 07:30

## 2019-05-30 RX ADMIN — HYDROMORPHONE HYDROCHLORIDE 0.5 MILLIGRAM(S): 2 INJECTION INTRAMUSCULAR; INTRAVENOUS; SUBCUTANEOUS at 23:38

## 2019-05-30 RX ADMIN — Medication 2 PUFF(S): at 07:27

## 2019-05-30 RX ADMIN — ENOXAPARIN SODIUM 40 MILLIGRAM(S): 100 INJECTION SUBCUTANEOUS at 23:32

## 2019-05-30 RX ADMIN — CHLORHEXIDINE GLUCONATE 1 APPLICATION(S): 213 SOLUTION TOPICAL at 12:47

## 2019-05-30 RX ADMIN — HYDROMORPHONE HYDROCHLORIDE 0.5 MILLIGRAM(S): 2 INJECTION INTRAMUSCULAR; INTRAVENOUS; SUBCUTANEOUS at 23:05

## 2019-05-30 RX ADMIN — Medication 2 PUFF(S): at 22:30

## 2019-05-30 NOTE — PROGRESS NOTE ADULT - ATTENDING COMMENTS
Trimalleolar fracture indicated for ORIF.  patient ambulating with fracture dislocation 2 days prior to admission.

## 2019-05-30 NOTE — DISCHARGE NOTE NURSING/CASE MANAGEMENT/SOCIAL WORK - NSDCDPATPORTLINK_GEN_ALL_CORE
You can access the [facility] Patient Portal, offered by [facility], by registering with the following website: [email or web address]

## 2019-05-30 NOTE — PROGRESS NOTE ADULT - SUBJECTIVE AND OBJECTIVE BOX
Patient is a 69y old  Female who presents with a chief complaint of Left Ankle Fracture (29 May 2019 11:23)    pt seen in icu [  ], reg med floor [x   ], bed [ x ], chair at bedside [   ], a+o x3 [ x ], lethargic [  ],  nad [ x ]      Allergies    No Known Allergies        Vitals    T(F): 97.9 (05-30-19 @ 05:13), Max: 98.3 (05-29-19 @ 21:47)  HR: 84 (05-30-19 @ 05:13) (84 - 102)  BP: 121/55 (05-30-19 @ 05:13) (121/55 - 147/65)  RR: 16 (05-30-19 @ 05:13) (16 - 16)  SpO2: 100% (05-30-19 @ 05:13) (99% - 100%)  Wt(kg): --  CAPILLARY BLOOD GLUCOSE          Labs                          13.4   12.45 )-----------( 399      ( 28 May 2019 17:03 )             40.7       05-29    133<L>  |  102  |  13  ----------------------------<  100<H>  3.8   |  25  |  0.71    Ca    8.5      29 May 2019 11:26                  Radiology Results      Meds    MEDICATIONS  (STANDING):  carBAMazepine 200 milliGRAM(s) Oral three times a day  cetirizine 10 milliGRAM(s) Oral daily  chlorhexidine 2% Cloths 1 Application(s) Topical daily  dextrose 5% + sodium chloride 0.9%. 1000 milliLiter(s) (100 mL/Hr) IV Continuous <Continuous>  enalapril 10 milliGRAM(s) Oral two times a day  enoxaparin Injectable 40 milliGRAM(s) SubCutaneous every 24 hours  esOMEPRAZOLE 20 milliGRAM(s) Oral before breakfast  fluticasone propionate   110 MICROgram(s) HFA Inhaler 2 Puff(s) Inhalation two times a day  montelukast 10 milliGRAM(s) Oral at bedtime      MEDICATIONS  (PRN):  Cwvsyprhhnzzc738dv/Iyrtsrfcpwifemm07.5mg 1 Tablet(s) 1 Tablet(s) Oral every 6 hours PRN mild pain  ALBUTerol    90 MICROgram(s) HFA Inhaler 2 Puff(s) Inhalation every 6 hours PRN Shortness of Breath and/or Wheezing      Physical Exam      Neuro :  no focal deficits  Respiratory: CTA B/L  CV: RRR, S1S2, no murmurs,   Abdominal: Soft, NT, ND +BS,  Extremities: No edema, + peripheral pulses, left leg splint    ASSESSMENT    left Closed displaced trimalleolar fracture  h/o Trigeminal neuralgia  Hypertension  asthma         PLAN      pt to have Surgical orif today  pt NPO with ivf  cont Pain control  cont DVT prophylaxis with lovenox  pt with moderate risk for periop cardiac complications for the proposed surgical procedure  pulm cons noted  Bronchodilators  Add Symbicort 160mcg 2 Puff BID with spacer.   Add Singulair QHS.   PFTs as OP.  Clear at moderate risk from pulmonary standpoint for intended procedure.   cont current meds

## 2019-05-30 NOTE — PROGRESS NOTE ADULT - SUBJECTIVE AND OBJECTIVE BOX
Ortho Preop Note    Patient is a 70 y/o Female who presents with a chief complaint of Left Ankle Fracture.  Pt states she is currently feeling pain in her heel with mild numbness.  Denies fever, chills, SOB, CP.    Diagnosis: L ankle Fx  Procedure: ORIF of L ankle  Surgeon: Henry                           13.4   12.45 )-----------( 399      ( 28 May 2019 17:03 )             40.7     05-29    133<L>  |  102  |  13  ----------------------------<  100<H>  3.8   |  25  |  0.71    Ca    8.5      29 May 2019 11:26      PT/INR - ( 28 May 2019 17:03 )   PT: 12.8 sec;   INR: 1.15 ratio         PTT - ( 28 May 2019 17:03 )  PTT:28.8 sec    LLE: Ankle in splint. + motion of all digits. Sensation intact.       Assessment & Plan:  69yFemale with L ankle Fx  - For OR on 5/30/19 - ORIF of L Ankle   - Case discussed with Dr Figueroa  - Cleared and consented for surgery  - Case discussed with Dr Figueroa       Ortho Line [id number]

## 2019-05-30 NOTE — PROGRESS NOTE ADULT - ASSESSMENT
1. Closed Trimalleolar Fracture of Left Ankle  - Planning for surgery today  - Ortho f.u  - NPO   - Pain control.  - DVT and GI PPX     2. Asthma  - Bronchodilators  - Add Symbicort 160mcg 2 Puff BID with spacer.   - Add Singulair QHS.   - PFTs as OP.  - Clear at moderate risk from pulmonary standpoint for intended procedure.     3. HTN  - Continue meds  - Monitor BP

## 2019-05-30 NOTE — PROGRESS NOTE ADULT - SUBJECTIVE AND OBJECTIVE BOX
Patient is a 69y old  Female who presents with a chief complaint of Left Ankle Fracture (30 May 2019 08:47)  Awake, alert, comfortable in bed in NAD. NPO awaiting for left ankle surgery. Has been taking ephedrine BID for Asthma control and rescue BD.    INTERVAL HPI/OVERNIGHT EVENTS:      VITAL SIGNS:  T(F): 97.9 (05-30-19 @ 05:13)  HR: 84 (05-30-19 @ 05:13)  BP: 121/55 (05-30-19 @ 05:13)  RR: 16 (05-30-19 @ 05:13)  SpO2: 100% (05-30-19 @ 05:13)  Wt(kg): --  I&O's Detail          REVIEW OF SYSTEMS:    CONSTITUTIONAL:  No fevers, chills, sweats    HEENT:  Eyes:  No diplopia or blurred vision. ENT:  No earache, sore throat or runny nose.    CARDIOVASCULAR:  No pressure, squeezing, tightness, or heaviness about the chest; no palpitations.    RESPIRATORY:  Per HPI    GASTROINTESTINAL:  No abdominal pain, nausea, vomiting or diarrhea.    GENITOURINARY:  No dysuria, frequency or urgency.    NEUROLOGIC:  No paresthesias, fasciculations, seizures or weakness.    PSYCHIATRIC:  No disorder of thought or mood.      PHYSICAL EXAM:    Constitutional: Well developed and nourished  Eyes:Perrla  ENMT: normal  Neck:supple  Respiratory: good air entry  Cardiovascular: S1 S2 regular  Gastrointestinal: Soft, Non tender  Extremities: No edema  Vascular:normal  Neurological:Awake, alert,Ox3  Musculoskeletal:Normal      MEDICATIONS  (STANDING):  carBAMazepine 200 milliGRAM(s) Oral three times a day  cetirizine 10 milliGRAM(s) Oral daily  chlorhexidine 2% Cloths 1 Application(s) Topical daily  dextrose 5% + sodium chloride 0.9%. 1000 milliLiter(s) (100 mL/Hr) IV Continuous <Continuous>  enalapril 10 milliGRAM(s) Oral two times a day  enoxaparin Injectable 40 milliGRAM(s) SubCutaneous every 24 hours  esOMEPRAZOLE 20 milliGRAM(s) Oral before breakfast  fluticasone propionate   110 MICROgram(s) HFA Inhaler 2 Puff(s) Inhalation two times a day  montelukast 10 milliGRAM(s) Oral at bedtime    MEDICATIONS  (PRN):  Yjsonjmhpynhe295ga/Ebxsajiqtljrlni67.5mg 1 Tablet(s) 1 Tablet(s) Oral every 6 hours PRN mild pain  ALBUTerol    90 MICROgram(s) HFA Inhaler 2 Puff(s) Inhalation every 6 hours PRN Shortness of Breath and/or Wheezing      Allergies    No Known Allergies    Intolerances        LABS:                        13.4   12.45 )-----------( 399      ( 28 May 2019 17:03 )             40.7     05-29    133<L>  |  102  |  13  ----------------------------<  100<H>  3.8   |  25  |  0.71    Ca    8.5      29 May 2019 11:26      PT/INR - ( 28 May 2019 17:03 )   PT: 12.8 sec;   INR: 1.15 ratio         PTT - ( 28 May 2019 17:03 )  PTT:28.8 sec          CAPILLARY BLOOD GLUCOSE            RADIOLOGY & ADDITIONAL TESTS:    CXR:    < from: Xray Chest 1 View AP/PA (05.28.19 @ 18:22) >  Impression: No evidence for acute pulmonary infiltrate, pleural effusion,   or pneumothorax.     Stable cardiac silhouette.     No pulmonary vascular congestion.    Central retrocardiac density, suggestive of a hiatal hernia.    < end of copied text >  Ct scan chest:    ekg;    echo:

## 2019-05-31 DIAGNOSIS — M25.572 PAIN IN LEFT ANKLE AND JOINTS OF LEFT FOOT: ICD-10-CM

## 2019-05-31 DIAGNOSIS — Z96.7 PRESENCE OF OTHER BONE AND TENDON IMPLANTS: ICD-10-CM

## 2019-05-31 DIAGNOSIS — S82.853A DISPLACED TRIMALLEOLAR FRACTURE OF UNSPECIFIED LOWER LEG, INITIAL ENCOUNTER FOR CLOSED FRACTURE: ICD-10-CM

## 2019-05-31 RX ORDER — OXYCODONE HYDROCHLORIDE 5 MG/1
10 TABLET ORAL EVERY 4 HOURS
Refills: 0 | Status: DISCONTINUED | OUTPATIENT
Start: 2019-05-31 | End: 2019-06-02

## 2019-05-31 RX ORDER — KETOROLAC TROMETHAMINE 30 MG/ML
30 SYRINGE (ML) INJECTION ONCE
Refills: 0 | Status: DISCONTINUED | OUTPATIENT
Start: 2019-05-31 | End: 2019-05-31

## 2019-05-31 RX ADMIN — CHLORHEXIDINE GLUCONATE 1 APPLICATION(S): 213 SOLUTION TOPICAL at 11:21

## 2019-05-31 RX ADMIN — SODIUM CHLORIDE 105 MILLILITER(S): 9 INJECTION INTRAMUSCULAR; INTRAVENOUS; SUBCUTANEOUS at 01:25

## 2019-05-31 RX ADMIN — Medication 30 MILLIGRAM(S): at 11:01

## 2019-05-31 RX ADMIN — OXYCODONE HYDROCHLORIDE 5 MILLIGRAM(S): 5 TABLET ORAL at 02:24

## 2019-05-31 RX ADMIN — Medication 10 MILLIGRAM(S): at 17:13

## 2019-05-31 RX ADMIN — OXYCODONE HYDROCHLORIDE 5 MILLIGRAM(S): 5 TABLET ORAL at 01:24

## 2019-05-31 RX ADMIN — OXYCODONE HYDROCHLORIDE 10 MILLIGRAM(S): 5 TABLET ORAL at 21:25

## 2019-05-31 RX ADMIN — Medication 2 PUFF(S): at 10:04

## 2019-05-31 RX ADMIN — Medication 100 MILLIGRAM(S): at 08:11

## 2019-05-31 RX ADMIN — OXYCODONE HYDROCHLORIDE 5 MILLIGRAM(S): 5 TABLET ORAL at 10:04

## 2019-05-31 RX ADMIN — MONTELUKAST 10 MILLIGRAM(S): 4 TABLET, CHEWABLE ORAL at 21:24

## 2019-05-31 RX ADMIN — OXYCODONE HYDROCHLORIDE 10 MILLIGRAM(S): 5 TABLET ORAL at 22:00

## 2019-05-31 RX ADMIN — Medication 30 MILLIGRAM(S): at 11:22

## 2019-05-31 RX ADMIN — Medication 10 MILLIGRAM(S): at 06:46

## 2019-05-31 RX ADMIN — Medication 200 MILLIGRAM(S): at 12:49

## 2019-05-31 RX ADMIN — Medication 100 MILLIGRAM(S): at 01:25

## 2019-05-31 RX ADMIN — Medication 100 MILLIGRAM(S): at 21:24

## 2019-05-31 RX ADMIN — OXYCODONE HYDROCHLORIDE 5 MILLIGRAM(S): 5 TABLET ORAL at 08:36

## 2019-05-31 NOTE — CONSULT NOTE ADULT - PROBLEM SELECTOR PROBLEM 2
S/P ORIF (open reduction internal fixation) fracture
Closed trimalleolar fracture of left ankle, initial encounter

## 2019-05-31 NOTE — CONSULT NOTE ADULT - PROBLEM SELECTOR RECOMMENDATION 9
- oxycodone 10mg po q 4 hours prn  - toradol stat dose  - PT / OOB  - stool softeners
- patient presented with fall, noted to have acute comminuted distal fibular fracture,  s/p closed reduction with splint application, plan for ORIF.  - patient currently denies any chest pain, dyspnea, palpitations or any other complaints  - EKG: NSR  - Functional and independent at baseline  - RCRI score of 0, indicating 3.9% 30 day risk of death, MI or cardiac arrest   - Patient is medially optimized at moderate risk for the planned procedure.

## 2019-05-31 NOTE — PHYSICAL THERAPY INITIAL EVALUATION ADULT - GENERAL OBSERVATIONS, REHAB EVAL
pt revisit resolving sx pain, aox4 motivated benefits of oob mobilization, s/p L ankle orif/cast, tolerating assisted adls and rw transfers amb assessment 2 feet LLE NWB to unstable balance

## 2019-05-31 NOTE — PROGRESS NOTE ADULT - SUBJECTIVE AND OBJECTIVE BOX
Patient is a 69y old  Female who presents with a chief complaint of Left Ankle Fracture (31 May 2019 08:45)      pt seen in icu [  ], reg med floor [x   ], bed [ x ], chair at bedside [   ], a+o x3 [ x ], lethargic [  ],  nad [ x ]      Allergies    No Known Allergies        Vitals    T(F): 98.1 (05-31-19 @ 05:18), Max: 99.4 (05-30-19 @ 22:29)  HR: 97 (05-31-19 @ 05:18) (85 - 102)  BP: 165/62 (05-31-19 @ 05:18) (132/71 - 165/62)  RR: 17 (05-31-19 @ 05:18) (15 - 19)  SpO2: 100% (05-31-19 @ 05:18) (95% - 100%)  Wt(kg): --  CAPILLARY BLOOD GLUCOSE          Labs        05-29    133<L>  |  102  |  13  ----------------------------<  100<H>  3.8   |  25  |  0.71    Ca    8.5      29 May 2019 11:26                  Radiology Results      Meds    MEDICATIONS  (STANDING):  carBAMazepine 200 milliGRAM(s) Oral three times a day  cetirizine 10 milliGRAM(s) Oral daily  chlorhexidine 2% Cloths 1 Application(s) Topical daily  dextrose 5% + sodium chloride 0.9%. 1000 milliLiter(s) (100 mL/Hr) IV Continuous <Continuous>  docusate sodium 100 milliGRAM(s) Oral three times a day  enalapril 10 milliGRAM(s) Oral two times a day  enoxaparin Injectable 40 milliGRAM(s) SubCutaneous every 24 hours  esOMEPRAZOLE 20 milliGRAM(s) Oral before breakfast  fluticasone propionate   110 MICROgram(s) HFA Inhaler 2 Puff(s) Inhalation two times a day  ketorolac   Injectable 30 milliGRAM(s) IV Push once  montelukast 10 milliGRAM(s) Oral at bedtime  sodium chloride 0.9%. 1000 milliLiter(s) (105 mL/Hr) IV Continuous <Continuous>      MEDICATIONS  (PRN):  acetaminophen   Tablet .. 325 milliGRAM(s) Oral every 4 hours PRN Mild Pain (1 - 3)  Fgtmdsgemdskd837aw/Idcppzkeoznlgau02.5mg 1 Tablet(s) 1 Tablet(s) Oral every 6 hours PRN mild pain  ALBUTerol    90 MICROgram(s) HFA Inhaler 2 Puff(s) Inhalation every 6 hours PRN Shortness of Breath and/or Wheezing  ondansetron Injectable 4 milliGRAM(s) IV Push every 6 hours PRN Nausea and/or Vomiting  oxyCODONE    IR 5 milliGRAM(s) Oral every 4 hours PRN Moderate Pain (4 - 6)      Physical Exam      Neuro :  no focal deficits  Respiratory: CTA B/L  CV: RRR, S1S2, no murmurs,   Abdominal: Soft, NT, ND +BS,  Extremities: No edema, + peripheral pulses, left leg splint        ASSESSMENT    left Closed displaced trimalleolar fracture  h/o Trigeminal neuralgia  Hypertension  asthma         PLAN      s/p Surgical orif today  cont Pain control  cont DVT prophylaxis with lovenox  pulm f/u  Bronchodilators  Symbicort 160mcg 2 Puff BID with spacer.   Singulair QHS.   PFTs as OP.  incentive spirometer  PT eval   cont current meds

## 2019-05-31 NOTE — PROGRESS NOTE ADULT - SUBJECTIVE AND OBJECTIVE BOX
Patient is a 69y old  Female who presents with a chief complaint of Left Ankle Fracture (31 May 2019 10:42)    Awake, alert, comfortable in bed in NAD. NPO awaiting for left ankle surgery. Has been taking ephedrine BID for Asthma control and rescue BD. No cough or sob at this moment.     INTERVAL HPI/OVERNIGHT EVENTS:      VITAL SIGNS:  T(F): 98.1 (05-31-19 @ 05:18)  HR: 97 (05-31-19 @ 05:18)  BP: 165/62 (05-31-19 @ 05:18)  RR: 17 (05-31-19 @ 05:18)  SpO2: 100% (05-31-19 @ 05:18)  Wt(kg): --  I&O's Detail    30 May 2019 07:01  -  31 May 2019 07:00  --------------------------------------------------------  IN:    Lactated Ringers IV Bolus: 1000 mL  Total IN: 1000 mL    OUT:  Total OUT: 0 mL    Total NET: 1000 mL              REVIEW OF SYSTEMS:    CONSTITUTIONAL:  No fevers, chills, sweats    HEENT:  Eyes:  No diplopia or blurred vision. ENT:  No earache, sore throat or runny nose.    CARDIOVASCULAR:  No pressure, squeezing, tightness, or heaviness about the chest; no palpitations.    RESPIRATORY:  Per HPI    GASTROINTESTINAL:  No abdominal pain, nausea, vomiting or diarrhea.    GENITOURINARY:  No dysuria, frequency or urgency.    NEUROLOGIC:  No paresthesias, fasciculations, seizures or weakness.    PSYCHIATRIC:  No disorder of thought or mood.      PHYSICAL EXAM:    Constitutional: Well developed and nourished  Eyes:Perrla  ENMT: normal  Neck:supple  Respiratory: good air entry  Cardiovascular: S1 S2 regular  Gastrointestinal: Soft, Non tender  Extremities: No edema  Vascular:normal  Neurological:Awake, alert,Ox3  Musculoskeletal:Normal      MEDICATIONS  (STANDING):  carBAMazepine 200 milliGRAM(s) Oral three times a day  cetirizine 10 milliGRAM(s) Oral daily  chlorhexidine 2% Cloths 1 Application(s) Topical daily  dextrose 5% + sodium chloride 0.9%. 1000 milliLiter(s) (100 mL/Hr) IV Continuous <Continuous>  docusate sodium 100 milliGRAM(s) Oral three times a day  enalapril 10 milliGRAM(s) Oral two times a day  enoxaparin Injectable 40 milliGRAM(s) SubCutaneous every 24 hours  esOMEPRAZOLE 20 milliGRAM(s) Oral before breakfast  fluticasone propionate   110 MICROgram(s) HFA Inhaler 2 Puff(s) Inhalation two times a day  montelukast 10 milliGRAM(s) Oral at bedtime  sodium chloride 0.9%. 1000 milliLiter(s) (105 mL/Hr) IV Continuous <Continuous>    MEDICATIONS  (PRN):  acetaminophen   Tablet .. 325 milliGRAM(s) Oral every 4 hours PRN Mild Pain (1 - 3)  Ovfurznjnehpe220nq/Ijuqiouxeocqefe98.5mg 1 Tablet(s) 1 Tablet(s) Oral every 6 hours PRN mild pain  ALBUTerol    90 MICROgram(s) HFA Inhaler 2 Puff(s) Inhalation every 6 hours PRN Shortness of Breath and/or Wheezing  ondansetron Injectable 4 milliGRAM(s) IV Push every 6 hours PRN Nausea and/or Vomiting  oxyCODONE    IR 10 milliGRAM(s) Oral every 4 hours PRN Severe Pain (7 - 10)      Allergies    No Known Allergies    Intolerances        LABS:    05-29    133<L>  |  102  |  13  ----------------------------<  100<H>  3.8   |  25  |  0.71    Ca    8.5      29 May 2019 11:26                CAPILLARY BLOOD GLUCOSE            RADIOLOGY & ADDITIONAL TESTS:    CXR:    < from: Xray Chest 1 View AP/PA (05.28.19 @ 18:22) >  Impression: No evidence for acute pulmonary infiltrate, pleural effusion,   or pneumothorax.     Stable cardiac silhouette.     No pulmonary vascular congestion.    Central retrocardiac density, suggestive of a hiatal hernia.    < end of copied text >    Ct scan chest:    ekg;    echo: Awake, alert, lying in bed in NAD. S/P ORIF of Left Ankle - POD #1. Still with pain, seen by pain management NP. Pt is awaiting PT eval.     INTERVAL HPI/OVERNIGHT EVENTS:      VITAL SIGNS:  T(F): 98.1 (05-31-19 @ 05:18)  HR: 97 (05-31-19 @ 05:18)  BP: 165/62 (05-31-19 @ 05:18)  RR: 17 (05-31-19 @ 05:18)  SpO2: 100% (05-31-19 @ 05:18)  Wt(kg): --  I&O's Detail    30 May 2019 07:01  -  31 May 2019 07:00  --------------------------------------------------------  IN:    Lactated Ringers IV Bolus: 1000 mL  Total IN: 1000 mL    OUT:  Total OUT: 0 mL    Total NET: 1000 mL              REVIEW OF SYSTEMS:    CONSTITUTIONAL:  No fevers, chills, sweats    HEENT:  Eyes:  No diplopia or blurred vision. ENT:  No earache, sore throat or runny nose.    CARDIOVASCULAR:  No pressure, squeezing, tightness, or heaviness about the chest; no palpitations.    RESPIRATORY:  Per HPI    GASTROINTESTINAL:  No abdominal pain, nausea, vomiting or diarrhea.    GENITOURINARY:  No dysuria, frequency or urgency.    NEUROLOGIC:  No paresthesias, fasciculations, seizures or weakness.    PSYCHIATRIC:  No disorder of thought or mood.      PHYSICAL EXAM:    Constitutional: Well developed and nourished  Eyes:Perrla  ENMT: normal  Neck:supple  Respiratory: good air entry  Cardiovascular: S1 S2 regular  Gastrointestinal: Soft, Non tender  Extremities: left lower extremity dressing C/D/I; + edema; S/P ORIF left ankle. POD #1  Vascular:normal  Neurological:Awake, alert,Ox3  Musculoskeletal: WBAT per PT       MEDICATIONS  (STANDING):  carBAMazepine 200 milliGRAM(s) Oral three times a day  cetirizine 10 milliGRAM(s) Oral daily  chlorhexidine 2% Cloths 1 Application(s) Topical daily  dextrose 5% + sodium chloride 0.9%. 1000 milliLiter(s) (100 mL/Hr) IV Continuous <Continuous>  docusate sodium 100 milliGRAM(s) Oral three times a day  enalapril 10 milliGRAM(s) Oral two times a day  enoxaparin Injectable 40 milliGRAM(s) SubCutaneous every 24 hours  esOMEPRAZOLE 20 milliGRAM(s) Oral before breakfast  fluticasone propionate   110 MICROgram(s) HFA Inhaler 2 Puff(s) Inhalation two times a day  montelukast 10 milliGRAM(s) Oral at bedtime  sodium chloride 0.9%. 1000 milliLiter(s) (105 mL/Hr) IV Continuous <Continuous>    MEDICATIONS  (PRN):  acetaminophen   Tablet .. 325 milliGRAM(s) Oral every 4 hours PRN Mild Pain (1 - 3)  Ipegbvmymbjkz784cx/Cylecjsauuuxjmt44.5mg 1 Tablet(s) 1 Tablet(s) Oral every 6 hours PRN mild pain  ALBUTerol    90 MICROgram(s) HFA Inhaler 2 Puff(s) Inhalation every 6 hours PRN Shortness of Breath and/or Wheezing  ondansetron Injectable 4 milliGRAM(s) IV Push every 6 hours PRN Nausea and/or Vomiting  oxyCODONE    IR 10 milliGRAM(s) Oral every 4 hours PRN Severe Pain (7 - 10)      Allergies    No Known Allergies    Intolerances        LABS:    05-29    133<L>  |  102  |  13  ----------------------------<  100<H>  3.8   |  25  |  0.71    Ca    8.5      29 May 2019 11:26                CAPILLARY BLOOD GLUCOSE            RADIOLOGY & ADDITIONAL TESTS:    CXR:    < from: Xray Chest 1 View AP/PA (05.28.19 @ 18:22) >  Impression: No evidence for acute pulmonary infiltrate, pleural effusion,   or pneumothorax.     Stable cardiac silhouette.     No pulmonary vascular congestion.    Central retrocardiac density, suggestive of a hiatal hernia.    < end of copied text >    Ct scan chest:    ekg;    echo:

## 2019-05-31 NOTE — PROGRESS NOTE ADULT - ASSESSMENT
1. Closed Trimalleolar Fracture of Left Ankle  - Planning for surgery today  - Ortho f.u  - NPO   - Pain control.  - DVT and GI PPX     2. Asthma  - Bronchodilators  - Add Symbicort 160mcg 2 Puff BID with spacer.   - Add Singulair QHS.   - PFTs as OP.  - Clear at moderate risk from pulmonary standpoint for intended procedure.     3. HTN  - Continue meds  - Monitor BP 1. Closed Trimalleolar Fracture of Left Ankle  - S/P ORIF Left Ankle POD #1  - Ortho F/U  - PT Eval  - Pain management eval  - Incentive Spirometry  - Pain control.  - WBAT per PT.   - DVT and GI PPX     2. Asthma  - Bronchodilators  - Add Symbicort 160mcg 2 Puff BID with spacer.   - Add Singulair QHS.   - PFTs as OP.    3. HTN  - Continue meds  - Monitor BP

## 2019-05-31 NOTE — CONSULT NOTE ADULT - SUBJECTIVE AND OBJECTIVE BOX
NP Note discussed with  Primary Attending    CC:  left foot pain    Patient is a 69y old  Female who presents with a chief complaint of Left Ankle Fracture (31 May 2019 11:09).  Pt s/p orif left ankle, pod#2.  Pt lying in bed, nad.  + complaints of left ankle pain.  Pain worsens on exertion.  Pt states that oxycodone 5mg is not effective.  No nausea or vomiting.        INTERVAL HPI/OVERNIGHT EVENTS: no new complaints    MEDICATIONS  (STANDING):  carBAMazepine 200 milliGRAM(s) Oral three times a day  cetirizine 10 milliGRAM(s) Oral daily  chlorhexidine 2% Cloths 1 Application(s) Topical daily  dextrose 5% + sodium chloride 0.9%. 1000 milliLiter(s) (100 mL/Hr) IV Continuous <Continuous>  docusate sodium 100 milliGRAM(s) Oral three times a day  enalapril 10 milliGRAM(s) Oral two times a day  enoxaparin Injectable 40 milliGRAM(s) SubCutaneous every 24 hours  esOMEPRAZOLE 20 milliGRAM(s) Oral before breakfast  fluticasone propionate   110 MICROgram(s) HFA Inhaler 2 Puff(s) Inhalation two times a day  montelukast 10 milliGRAM(s) Oral at bedtime  sodium chloride 0.9%. 1000 milliLiter(s) (105 mL/Hr) IV Continuous <Continuous>    MEDICATIONS  (PRN):  acetaminophen   Tablet .. 325 milliGRAM(s) Oral every 4 hours PRN Mild Pain (1 - 3)  Lecmywkzsahii296nm/Plcuizwhtjrhtvb45.5mg 1 Tablet(s) 1 Tablet(s) Oral every 6 hours PRN mild pain  ALBUTerol    90 MICROgram(s) HFA Inhaler 2 Puff(s) Inhalation every 6 hours PRN Shortness of Breath and/or Wheezing  ondansetron Injectable 4 milliGRAM(s) IV Push every 6 hours PRN Nausea and/or Vomiting  oxyCODONE    IR 10 milliGRAM(s) Oral every 4 hours PRN Severe Pain (7 - 10)      __________________________________________________  REVIEW OF SYSTEMS:    CONSTITUTIONAL: No fever,   EYES: no acute visual disturbances  NECK: No pain or stiffness  RESPIRATORY: No cough; No shortness of breath  CARDIOVASCULAR: No chest pain, no palpitations  GASTROINTESTINAL: No pain. No nausea or vomiting; No diarrhea   NEUROLOGICAL: No headache or numbness, no tremors  MUSCULOSKELETAL: + left foot pain  GENITOURINARY: no dysuria, no frequency, no hesitancy  PSYCHIATRY: no depression , no anxiety  ALL OTHER  ROS negative        Vital Signs Last 24 Hrs  T(C): 36.7 (31 May 2019 05:18), Max: 37.4 (30 May 2019 22:29)  T(F): 98.1 (31 May 2019 05:18), Max: 99.4 (30 May 2019 22:29)  HR: 97 (31 May 2019 05:18) (85 - 102)  BP: 165/62 (31 May 2019 05:18) (132/71 - 165/62)  BP(mean): 94 (30 May 2019 21:46) (84 - 113)  RR: 17 (31 May 2019 05:18) (15 - 19)  SpO2: 100% (31 May 2019 05:18) (95% - 100%)    ________________________________________________  PHYSICAL EXAM:  GENERAL: NAD  HEENT: Normocephalic;  conjunctivae and sclerae clear; moist mucous membranes;   NECK : supple  CHEST/LUNG: Clear to auscultation bilaterally with good air entry   HEART: S1 S2  regular; no murmurs, gallops or rubs  ABDOMEN: Soft, Nontender, Nondistended; Bowel sounds present  EXTREMITIES: no cyanosis; no edema; no calf tenderness  NERVOUS SYSTEM:  Awake and alert; Oriented  to place, person and time ; no new deficits  MUSCULOSKELETAL: + left foot tenderness + decreased rom   _________________________________________________  LABS:              CAPILLARY BLOOD GLUCOSE            RADIOLOGY & ADDITIONAL TESTS:    Imaging Personally Reviewed:  YES/NO    Consultant(s) Notes Reviewed:   YES/ No    Care Discussed with Consultants :     Plan of care was discussed with patient and /or primary care giver; all questions and concerns were addressed and care was aligned with patient's wishes.

## 2019-05-31 NOTE — PROGRESS NOTE ADULT - SUBJECTIVE AND OBJECTIVE BOX
Ortho Note POD# 1  69yFemale    Diagnosis:  S/p Left Ankle ORIF POD# 1    Patient is seen and evaluated at bedside; offers. Pain is moderate and patient states she needs more medication. Denies numbness/tingling, SOB, CP. Awaiting PT for ambulation.    Vital Signs Last 24 Hrs  T(C): 36.7 (31 May 2019 05:18), Max: 37.4 (30 May 2019 22:29)  T(F): 98.1 (31 May 2019 05:18), Max: 99.4 (30 May 2019 22:29)  HR: 97 (31 May 2019 05:18) (85 - 102)  BP: 165/62 (31 May 2019 05:18) (132/71 - 165/62)  BP(mean): 94 (30 May 2019 21:46) (84 - 113)  RR: 17 (31 May 2019 05:18) (15 - 19)  SpO2: 100% (31 May 2019 05:18) (95% - 100%)  I&O's Detail    30 May 2019 07:01  -  31 May 2019 07:00  --------------------------------------------------------  IN:    Lactated Ringers IV Bolus: 1000 mL  Total IN: 1000 mL    OUT:  Total OUT: 0 mL    Total NET: 1000 mL          Physical Exam:    General: AAOx3, in NAD, resting comfortably in bed.    LLE:   Splint intact and dressing is C/D/I.  R Calf is soft, non-tender. = motin in all digits. 2+pulses at RLE. NVI.      No new labs        Impression:  69yFemale S/p L Ankle ORIF POD# 1  Plan:  -  Continue pain management - Call Regulo regarding adjustment of meds  -  DVT prophylaxis with Lovenox  -  Daily Physical Therapy:  NWB of LLE with Crutches  -  Discharge planning: Home Vs. Rehab pending Physical therapy eval.  -  Continue Antibiotics x 24hrs post-op.  -  Encouraged use of incentive spirometer  -  Case d/w

## 2019-05-31 NOTE — PHYSICAL THERAPY INITIAL EVALUATION ADULT - IMPAIRMENTS FOUND, PT EVAL
gait, locomotion, and balance/gross motor/muscle strength/joint integrity and mobility/ROM/aerobic capacity/endurance

## 2019-06-01 RX ADMIN — Medication 200 MILLIGRAM(S): at 00:44

## 2019-06-01 RX ADMIN — OXYCODONE HYDROCHLORIDE 10 MILLIGRAM(S): 5 TABLET ORAL at 16:00

## 2019-06-01 RX ADMIN — Medication 100 MILLIGRAM(S): at 13:12

## 2019-06-01 RX ADMIN — ESOMEPRAZOLE MAGNESIUM 20 MILLIGRAM(S): 40 CAPSULE, DELAYED RELEASE ORAL at 05:51

## 2019-06-01 RX ADMIN — Medication 100 MILLIGRAM(S): at 21:22

## 2019-06-01 RX ADMIN — Medication 100 MILLIGRAM(S): at 05:44

## 2019-06-01 RX ADMIN — Medication 10 MILLIGRAM(S): at 17:09

## 2019-06-01 RX ADMIN — CHLORHEXIDINE GLUCONATE 1 APPLICATION(S): 213 SOLUTION TOPICAL at 11:53

## 2019-06-01 RX ADMIN — Medication 200 MILLIGRAM(S): at 21:24

## 2019-06-01 RX ADMIN — CETIRIZINE HYDROCHLORIDE 10 MILLIGRAM(S): 10 TABLET ORAL at 11:52

## 2019-06-01 RX ADMIN — ENOXAPARIN SODIUM 40 MILLIGRAM(S): 100 INJECTION SUBCUTANEOUS at 21:24

## 2019-06-01 RX ADMIN — Medication 200 MILLIGRAM(S): at 13:11

## 2019-06-01 RX ADMIN — Medication 325 MILLIGRAM(S): at 05:47

## 2019-06-01 RX ADMIN — ENOXAPARIN SODIUM 40 MILLIGRAM(S): 100 INJECTION SUBCUTANEOUS at 00:44

## 2019-06-01 RX ADMIN — OXYCODONE HYDROCHLORIDE 10 MILLIGRAM(S): 5 TABLET ORAL at 15:38

## 2019-06-01 RX ADMIN — Medication 325 MILLIGRAM(S): at 06:30

## 2019-06-01 RX ADMIN — Medication 200 MILLIGRAM(S): at 05:50

## 2019-06-01 RX ADMIN — Medication 10 MILLIGRAM(S): at 05:44

## 2019-06-01 NOTE — PROGRESS NOTE ADULT - SUBJECTIVE AND OBJECTIVE BOX
Ortho Note POD# 2  69yFemale    Diagnosis:  S/p Left Ankle ORIF POD# 2    Patient is seen and evaluated at bedside; offers. Pain is moderate and patient states she needs more medication. Denies numbness/tingling, SOB, CP.    ICU Vital Signs Last 24 Hrs  T(C): 36.4 (01 Jun 2019 06:00), Max: 37.4 (31 May 2019 14:29)  T(F): 97.6 (01 Jun 2019 06:00), Max: 99.4 (31 May 2019 14:29)  HR: 92 (01 Jun 2019 10:33) (91 - 98)  BP: 137/60 (01 Jun 2019 10:33) (120/73 - 149/68)  BP(mean): --  ABP: --  ABP(mean): --  RR: 16 (01 Jun 2019 06:00) (16 - 18)  SpO2: 100% (01 Jun 2019 10:33) (96% - 100%)        Physical Exam:    General: AAOx3, in NAD, resting comfortably in bed.    LLE:   Splint intact and dressing is C/D/I.  R Calf is soft, non-tender. + motion in all digits. 2+pulses at RLE. NVI.      No new labs        Impression:  69yFemale S/p L Ankle ORIF POD# 2  Plan:  -  Continue pain management -   -  DVT prophylaxis with Lovenox  -  Daily Physical Therapy:  NWB of LLE with Crutches  -  Discharge planning: Rehab pending Physical therapy eval.  -  Encouraged use of incentive spirometer  -  Case d/w

## 2019-06-01 NOTE — PROGRESS NOTE ADULT - ASSESSMENT
1. Closed Trimalleolar Fracture of Left Ankle  - S/P ORIF Left Ankle POD #2  - Ortho F/U  - PT   - Pain management eval  - Incentive Spirometry  - Pain control.  - WBAT per PT.   - DVT and GI PPX     2. Asthma  - Bronchodilators  - Add Symbicort 160mcg 2 Puff BID with spacer.   - Add Singulair QHS.   - PFTs as OP.    3. HTN  - Continue meds  - Monitor BP

## 2019-06-01 NOTE — PROGRESS NOTE ADULT - SUBJECTIVE AND OBJECTIVE BOX
Awake, alert, lying in bed in NAD. S/P ORIF of Left Ankle - POD #2. Still with some pain.     INTERVAL HPI/OVERNIGHT EVENTS:      VITAL SIGNS:  T(F): 97.6 (06-01-19 @ 06:00)  HR: 91 (06-01-19 @ 06:00)  BP: 120/73 (06-01-19 @ 06:00)  RR: 16 (06-01-19 @ 06:00)  SpO2: 100% (06-01-19 @ 06:00)  Wt(kg): --  I&O's Detail          REVIEW OF SYSTEMS:    CONSTITUTIONAL:  No fevers, chills, sweats    HEENT:  Eyes:  No diplopia or blurred vision. ENT:  No earache, sore throat or runny nose.    CARDIOVASCULAR:  No pressure, squeezing, tightness, or heaviness about the chest; no palpitations.    RESPIRATORY:  Per HPI    GASTROINTESTINAL:  No abdominal pain, nausea, vomiting or diarrhea.    GENITOURINARY:  No dysuria, frequency or urgency.    NEUROLOGIC:  No paresthesias, fasciculations, seizures or weakness.    PSYCHIATRIC:  No disorder of thought or mood.      PHYSICAL EXAM:    Constitutional: Well developed and nourished  Eyes:Perrla  ENMT: normal  Neck:supple  Respiratory: good air entry  Cardiovascular: S1 S2 regular  Gastrointestinal: Soft, Non tender  Extremities: + edema LLL dressing cdi.   Vascular:normal  Neurological:Awake, alert,Ox3  Musculoskeletal: WBAT per PT       MEDICATIONS  (STANDING):  carBAMazepine 200 milliGRAM(s) Oral three times a day  cetirizine 10 milliGRAM(s) Oral daily  chlorhexidine 2% Cloths 1 Application(s) Topical daily  dextrose 5% + sodium chloride 0.9%. 1000 milliLiter(s) (100 mL/Hr) IV Continuous <Continuous>  docusate sodium 100 milliGRAM(s) Oral three times a day  enalapril 10 milliGRAM(s) Oral two times a day  enoxaparin Injectable 40 milliGRAM(s) SubCutaneous every 24 hours  esOMEPRAZOLE 20 milliGRAM(s) Oral before breakfast  fluticasone propionate   110 MICROgram(s) HFA Inhaler 2 Puff(s) Inhalation two times a day  montelukast 10 milliGRAM(s) Oral at bedtime  sodium chloride 0.9%. 1000 milliLiter(s) (105 mL/Hr) IV Continuous <Continuous>    MEDICATIONS  (PRN):  acetaminophen   Tablet .. 325 milliGRAM(s) Oral every 4 hours PRN Mild Pain (1 - 3)  Yhnvqefixluxe269gq/Suptiijkocvkvev78.5mg 1 Tablet(s) 1 Tablet(s) Oral every 6 hours PRN mild pain  ALBUTerol    90 MICROgram(s) HFA Inhaler 2 Puff(s) Inhalation every 6 hours PRN Shortness of Breath and/or Wheezing  ondansetron Injectable 4 milliGRAM(s) IV Push every 6 hours PRN Nausea and/or Vomiting  oxyCODONE    IR 10 milliGRAM(s) Oral every 4 hours PRN Severe Pain (7 - 10)      Allergies    No Known Allergies    Intolerances        LABS:                    CAPILLARY BLOOD GLUCOSE            RADIOLOGY & ADDITIONAL TESTS:    CXR:    Ct scan chest:    ekg;    echo:

## 2019-06-01 NOTE — PROGRESS NOTE ADULT - SUBJECTIVE AND OBJECTIVE BOX
Patient is a 69y old  Female who presents with a chief complaint of Left Ankle Fracture (01 Jun 2019 10:02)      pt seen in icu [  ], reg med floor [x   ], bed [ x ], chair at bedside [   ], a+o x3 [ x ], lethargic [  ],  nad [ x ]      Allergies    No Known Allergies        Vitals    T(F): 97.6 (06-01-19 @ 06:00), Max: 99.4 (05-31-19 @ 14:29)  HR: 92 (06-01-19 @ 10:33) (91 - 98)  BP: 137/60 (06-01-19 @ 10:33) (120/73 - 149/68)  RR: 16 (06-01-19 @ 06:00) (16 - 18)  SpO2: 100% (06-01-19 @ 10:33) (96% - 100%)  Wt(kg): --  CAPILLARY BLOOD GLUCOSE          Labs                          Radiology Results      Meds    MEDICATIONS  (STANDING):  carBAMazepine 200 milliGRAM(s) Oral three times a day  cetirizine 10 milliGRAM(s) Oral daily  chlorhexidine 2% Cloths 1 Application(s) Topical daily  dextrose 5% + sodium chloride 0.9%. 1000 milliLiter(s) (100 mL/Hr) IV Continuous <Continuous>  docusate sodium 100 milliGRAM(s) Oral three times a day  enalapril 10 milliGRAM(s) Oral two times a day  enoxaparin Injectable 40 milliGRAM(s) SubCutaneous every 24 hours  esOMEPRAZOLE 20 milliGRAM(s) Oral before breakfast  fluticasone propionate   110 MICROgram(s) HFA Inhaler 2 Puff(s) Inhalation two times a day  montelukast 10 milliGRAM(s) Oral at bedtime  sodium chloride 0.9%. 1000 milliLiter(s) (105 mL/Hr) IV Continuous <Continuous>      MEDICATIONS  (PRN):  acetaminophen   Tablet .. 325 milliGRAM(s) Oral every 4 hours PRN Mild Pain (1 - 3)  Svruropnlkfyl764tb/Fuqpymqgugeupdo15.5mg 1 Tablet(s) 1 Tablet(s) Oral every 6 hours PRN mild pain  ALBUTerol    90 MICROgram(s) HFA Inhaler 2 Puff(s) Inhalation every 6 hours PRN Shortness of Breath and/or Wheezing  ondansetron Injectable 4 milliGRAM(s) IV Push every 6 hours PRN Nausea and/or Vomiting  oxyCODONE    IR 10 milliGRAM(s) Oral every 4 hours PRN Severe Pain (7 - 10)        Physical Exam      Neuro :  no focal deficits  Respiratory: CTA B/L  CV: RRR, S1S2, no murmurs,   Abdominal: Soft, NT, ND +BS,  Extremities: No edema, + peripheral pulses, left leg splint        ASSESSMENT    left Closed displaced trimalleolar fracture  h/o Trigeminal neuralgia  Hypertension  asthma         PLAN      s/p Surgical orif today  cont Pain control  cont DVT prophylaxis  pulm f/u  Bronchodilators  Symbicort 160mcg 2 Puff BID with spacer.   Singulair QHS.   PFTs as OP.  incentive spirometer  PT eval noted, recs rehab facility   cont current meds

## 2019-06-02 VITALS
OXYGEN SATURATION: 98 % | HEART RATE: 91 BPM | TEMPERATURE: 99 F | SYSTOLIC BLOOD PRESSURE: 142 MMHG | DIASTOLIC BLOOD PRESSURE: 68 MMHG | RESPIRATION RATE: 18 BRPM

## 2019-06-02 PROCEDURE — 86901 BLOOD TYPING SEROLOGIC RH(D): CPT

## 2019-06-02 PROCEDURE — 86900 BLOOD TYPING SEROLOGIC ABO: CPT

## 2019-06-02 PROCEDURE — 85027 COMPLETE CBC AUTOMATED: CPT

## 2019-06-02 PROCEDURE — C1713: CPT

## 2019-06-02 PROCEDURE — 80048 BASIC METABOLIC PNL TOTAL CA: CPT

## 2019-06-02 PROCEDURE — 85610 PROTHROMBIN TIME: CPT

## 2019-06-02 PROCEDURE — 93005 ELECTROCARDIOGRAM TRACING: CPT

## 2019-06-02 PROCEDURE — 76000 FLUOROSCOPY <1 HR PHYS/QHP: CPT

## 2019-06-02 PROCEDURE — 85730 THROMBOPLASTIN TIME PARTIAL: CPT

## 2019-06-02 PROCEDURE — 36415 COLL VENOUS BLD VENIPUNCTURE: CPT

## 2019-06-02 PROCEDURE — 71045 X-RAY EXAM CHEST 1 VIEW: CPT

## 2019-06-02 PROCEDURE — 99284 EMERGENCY DEPT VISIT MOD MDM: CPT | Mod: 25

## 2019-06-02 PROCEDURE — 73610 X-RAY EXAM OF ANKLE: CPT

## 2019-06-02 PROCEDURE — 94640 AIRWAY INHALATION TREATMENT: CPT

## 2019-06-02 PROCEDURE — 73600 X-RAY EXAM OF ANKLE: CPT

## 2019-06-02 PROCEDURE — 73590 X-RAY EXAM OF LOWER LEG: CPT

## 2019-06-02 PROCEDURE — 97530 THERAPEUTIC ACTIVITIES: CPT

## 2019-06-02 PROCEDURE — 86850 RBC ANTIBODY SCREEN: CPT

## 2019-06-02 PROCEDURE — 99285 EMERGENCY DEPT VISIT HI MDM: CPT | Mod: 25

## 2019-06-02 PROCEDURE — 29515 APPLICATION SHORT LEG SPLINT: CPT | Mod: LT

## 2019-06-02 PROCEDURE — 97116 GAIT TRAINING THERAPY: CPT

## 2019-06-02 PROCEDURE — C1769: CPT

## 2019-06-02 PROCEDURE — 97161 PT EVAL LOW COMPLEX 20 MIN: CPT

## 2019-06-02 RX ORDER — ACETAMINOPHEN 500 MG
1 TABLET ORAL
Qty: 0 | Refills: 0 | DISCHARGE
Start: 2019-06-02

## 2019-06-02 RX ORDER — BUDESONIDE AND FORMOTEROL FUMARATE DIHYDRATE 160; 4.5 UG/1; UG/1
0 AEROSOL RESPIRATORY (INHALATION)
Qty: 0 | Refills: 0 | DISCHARGE
Start: 2019-06-02

## 2019-06-02 RX ORDER — DOCUSATE SODIUM 100 MG
1 CAPSULE ORAL
Qty: 0 | Refills: 0 | DISCHARGE
Start: 2019-06-02

## 2019-06-02 RX ORDER — ASPIRIN/ACETAMINOPHEN/CAFFEINE 250-250-65
1 TABLET ORAL
Qty: 0 | Refills: 0 | DISCHARGE

## 2019-06-02 RX ORDER — ASPIRIN/CALCIUM CARB/MAGNESIUM 324 MG
1 TABLET ORAL
Qty: 60 | Refills: 0
Start: 2019-06-02 | End: 2019-07-01

## 2019-06-02 RX ORDER — OXYCODONE HYDROCHLORIDE 5 MG/1
1 TABLET ORAL
Qty: 0 | Refills: 0 | DISCHARGE
Start: 2019-06-02

## 2019-06-02 RX ORDER — EPHEDRINE HCL, GUAIFENESIN 12.5; 2 MG/1; MG/1
12 TABLET ORAL
Qty: 0 | Refills: 0 | DISCHARGE

## 2019-06-02 RX ORDER — BUDESONIDE AND FORMOTEROL FUMARATE DIHYDRATE 160; 4.5 UG/1; UG/1
2 AEROSOL RESPIRATORY (INHALATION)
Refills: 0 | Status: DISCONTINUED | OUTPATIENT
Start: 2019-06-02 | End: 2019-06-02

## 2019-06-02 RX ORDER — MONTELUKAST 4 MG/1
1 TABLET, CHEWABLE ORAL
Qty: 0 | Refills: 0 | DISCHARGE
Start: 2019-06-02

## 2019-06-02 RX ADMIN — Medication 10 MILLIGRAM(S): at 05:13

## 2019-06-02 RX ADMIN — ESOMEPRAZOLE MAGNESIUM 20 MILLIGRAM(S): 40 CAPSULE, DELAYED RELEASE ORAL at 06:03

## 2019-06-02 RX ADMIN — Medication 100 MILLIGRAM(S): at 05:13

## 2019-06-02 RX ADMIN — Medication 200 MILLIGRAM(S): at 05:43

## 2019-06-02 RX ADMIN — Medication 10 MILLIGRAM(S): at 17:43

## 2019-06-02 NOTE — DISCHARGE NOTE PROVIDER - CARE PROVIDER_API CALL
Victor Manuel Figueroa)  Orthopaedic Surgery  [address] [place] [address], [address]  [place] [address]  Phone: [phone]  Fax: [phone]  Follow Up Time: 1 week

## 2019-06-02 NOTE — PROGRESS NOTE ADULT - SUBJECTIVE AND OBJECTIVE BOX
Ortho Note POD# 3  Diagnosis: 69yFemale S/p ORIF Right Left ankle fracture POD# 3, is observed and evaluated at bedside. Offers no acute complaints. Pain mild (4/10); well controlled. Awaiting Rehab Placement.     Vital Signs Last 24 Hrs  T(C): 37.4 (02 Jun 2019 05:50), Max: 37.4 (02 Jun 2019 05:50)  T(F): 99.3 (02 Jun 2019 05:50), Max: 99.3 (02 Jun 2019 05:50)  HR: 89 (02 Jun 2019 05:50) (89 - 100)  BP: 135/57 (02 Jun 2019 05:50) (135/57 - 163/71)  BP(mean): --  RR: 17 (02 Jun 2019 05:50) (17 - 18)  SpO2: 95% (02 Jun 2019 05:50) (95% - 100%)  Right Left lower extremity:   In nl. alignment. Splint intact. Wound dressing clean/dry/intact. NV intact    A/P : 69yFemale s/p ORIF Right Left ankle fracture POD # 3  -    Pain control PRN  -    DVT ppx: SCD on the unaffected side  -    PT: NWB on RLE LLE with crutches  -    Resume home meds  -    D/c to rehab pending auth and acceptance  -    Keep the operated extremity elevated onto 2 pillows  -    Case d/w Dr. Figueroa Ortho Note POD# 3  Diagnosis: 69yFemale S/p ORIF Right Left ankle fracture POD# 3, is observed and evaluated at bedside. Offers no acute complaints. Pain mild (4/10); well controlled. Awaiting Rehab Placement.     Vital Signs Last 24 Hrs  T(C): 37.4 (02 Jun 2019 05:50), Max: 37.4 (02 Jun 2019 05:50)  T(F): 99.3 (02 Jun 2019 05:50), Max: 99.3 (02 Jun 2019 05:50)  HR: 89 (02 Jun 2019 05:50) (89 - 100)  BP: 135/57 (02 Jun 2019 05:50) (135/57 - 163/71)  BP(mean): --  RR: 17 (02 Jun 2019 05:50) (17 - 18)  SpO2: 95% (02 Jun 2019 05:50) (95% - 100%)  Right Left lower extremity:   In nl. alignment. Splint intact. Wound dressing clean/dry/intact. NV intact    A/P : 69yFemale s/p ORIF Right Left ankle fracture POD # 3  -    Pain control PRN  -    DVT ppx: SCD on the unaffected side  -    PT: NWB on RLE LLE with crutches  -    Resume home meds  -    D/c to rehab pending auth and acceptance  -    Keep the operated extremity elevated onto 2 pillows  -    Case d/w Dr. Figueroa    ADDENDUM 6/2/19 13:30  Case Management note appreciated.  Patient accepted to Rehab, no auth required.  will d/c to rehab today at 4pm.

## 2019-06-02 NOTE — PROGRESS NOTE ADULT - REASON FOR ADMISSION
Left Ankle Fracture

## 2019-06-02 NOTE — PROGRESS NOTE ADULT - SUBJECTIVE AND OBJECTIVE BOX
Patient is a 69y old  Female who presents with a chief complaint of Left Ankle Fracture (02 Jun 2019 11:05)    Awake, alert, lying in bed in NAD. S/P ORIF of Left Ankle - POD #2. Still with some pain.     INTERVAL HPI/OVERNIGHT EVENTS:      VITAL SIGNS:  T(F): 99.3 (06-02-19 @ 05:50)  HR: 89 (06-02-19 @ 05:50)  BP: 135/57 (06-02-19 @ 05:50)  RR: 17 (06-02-19 @ 05:50)  SpO2: 95% (06-02-19 @ 05:50)  Wt(kg): --  I&O's Detail          REVIEW OF SYSTEMS:    CONSTITUTIONAL:  No fevers, chills, sweats    HEENT:  Eyes:  No diplopia or blurred vision. ENT:  No earache, sore throat or runny nose.    CARDIOVASCULAR:  No pressure, squeezing, tightness, or heaviness about the chest; no palpitations.    RESPIRATORY:  Per HPI    GASTROINTESTINAL:  No abdominal pain, nausea, vomiting or diarrhea.    GENITOURINARY:  No dysuria, frequency or urgency.    NEUROLOGIC:  No paresthesias, fasciculations, seizures or weakness.    PSYCHIATRIC:  No disorder of thought or mood.      PHYSICAL EXAM:    Constitutional: Well developed and nourished  Eyes:Perrla  ENMT: normal  Neck:supple  Respiratory: good air entry  Cardiovascular: S1 S2 regular  Gastrointestinal: Soft, Non tender  Extremities: + edema LLL dressing cdi.   Vascular:normal  Neurological:Awake, alert,Ox3  Musculoskeletal: WBAT per PT     MEDICATIONS  (STANDING):  carBAMazepine 200 milliGRAM(s) Oral three times a day  cetirizine 10 milliGRAM(s) Oral daily  chlorhexidine 2% Cloths 1 Application(s) Topical daily  dextrose 5% + sodium chloride 0.9%. 1000 milliLiter(s) (100 mL/Hr) IV Continuous <Continuous>  docusate sodium 100 milliGRAM(s) Oral three times a day  enalapril 10 milliGRAM(s) Oral two times a day  enoxaparin Injectable 40 milliGRAM(s) SubCutaneous every 24 hours  esOMEPRAZOLE 20 milliGRAM(s) Oral before breakfast  fluticasone propionate   110 MICROgram(s) HFA Inhaler 2 Puff(s) Inhalation two times a day  montelukast 10 milliGRAM(s) Oral at bedtime  sodium chloride 0.9%. 1000 milliLiter(s) (105 mL/Hr) IV Continuous <Continuous>    MEDICATIONS  (PRN):  acetaminophen   Tablet .. 325 milliGRAM(s) Oral every 4 hours PRN Mild Pain (1 - 3)  Plhtdfxdhkrgk711zs/Brxajrcwbaxxbyk59.5mg 1 Tablet(s) 1 Tablet(s) Oral every 6 hours PRN mild pain  ALBUTerol    90 MICROgram(s) HFA Inhaler 2 Puff(s) Inhalation every 6 hours PRN Shortness of Breath and/or Wheezing  ondansetron Injectable 4 milliGRAM(s) IV Push every 6 hours PRN Nausea and/or Vomiting  oxyCODONE    IR 10 milliGRAM(s) Oral every 4 hours PRN Severe Pain (7 - 10)      Allergies    No Known Allergies    Intolerances        LABS:                    CAPILLARY BLOOD GLUCOSE            RADIOLOGY & ADDITIONAL TESTS:    CXR:  < from: Xray Chest 1 View AP/PA (05.28.19 @ 18:22) >  Impression: No evidence for acute pulmonary infiltrate, pleural effusion,   or pneumothorax.     Stable cardiac silhouette.     No pulmonary vascular congestion.    Central retrocardiac density, suggestive of a hiatal hernia.      < end of copied text >    Ct scan chest:    ekg;    echo: Patient is a 69y old  Female who presents with a chief complaint of Left Ankle Fracture (02 Jun 2019 11:05)    Awake, alert, lying in bed in NAD. S/P ORIF of Left Ankle - POD #2. Still with some pain. No cough or SOB at this moment . Pt has no new complaints.     INTERVAL HPI/OVERNIGHT EVENTS:      VITAL SIGNS:  T(F): 99.3 (06-02-19 @ 05:50)  HR: 89 (06-02-19 @ 05:50)  BP: 135/57 (06-02-19 @ 05:50)  RR: 17 (06-02-19 @ 05:50)  SpO2: 95% (06-02-19 @ 05:50)  Wt(kg): --  I&O's Detail          REVIEW OF SYSTEMS:    CONSTITUTIONAL:  No fevers, chills, sweats    HEENT:  Eyes:  No diplopia or blurred vision. ENT:  No earache, sore throat or runny nose.    CARDIOVASCULAR:  No pressure, squeezing, tightness, or heaviness about the chest; no palpitations.    RESPIRATORY:  Per HPI    GASTROINTESTINAL:  No abdominal pain, nausea, vomiting or diarrhea.    GENITOURINARY:  No dysuria, frequency or urgency.    NEUROLOGIC:  No paresthesias, fasciculations, seizures or weakness.    PSYCHIATRIC:  No disorder of thought or mood.      PHYSICAL EXAM:    Constitutional: Well developed and nourished  Eyes:Perrla  ENMT: normal  Neck:supple  Respiratory: good air entry  Cardiovascular: S1 S2 regular  Gastrointestinal: Soft, Non tender  Extremities: + edema LLL dressing cdi.   Vascular:normal  Neurological:Awake, alert,Ox3  Musculoskeletal: WBAT per PT     MEDICATIONS  (STANDING):  carBAMazepine 200 milliGRAM(s) Oral three times a day  cetirizine 10 milliGRAM(s) Oral daily  chlorhexidine 2% Cloths 1 Application(s) Topical daily  dextrose 5% + sodium chloride 0.9%. 1000 milliLiter(s) (100 mL/Hr) IV Continuous <Continuous>  docusate sodium 100 milliGRAM(s) Oral three times a day  enalapril 10 milliGRAM(s) Oral two times a day  enoxaparin Injectable 40 milliGRAM(s) SubCutaneous every 24 hours  esOMEPRAZOLE 20 milliGRAM(s) Oral before breakfast  fluticasone propionate   110 MICROgram(s) HFA Inhaler 2 Puff(s) Inhalation two times a day  montelukast 10 milliGRAM(s) Oral at bedtime  sodium chloride 0.9%. 1000 milliLiter(s) (105 mL/Hr) IV Continuous <Continuous>    MEDICATIONS  (PRN):  acetaminophen   Tablet .. 325 milliGRAM(s) Oral every 4 hours PRN Mild Pain (1 - 3)  Pbhgitudzvxch554ia/Meogamtempycudf44.5mg 1 Tablet(s) 1 Tablet(s) Oral every 6 hours PRN mild pain  ALBUTerol    90 MICROgram(s) HFA Inhaler 2 Puff(s) Inhalation every 6 hours PRN Shortness of Breath and/or Wheezing  ondansetron Injectable 4 milliGRAM(s) IV Push every 6 hours PRN Nausea and/or Vomiting  oxyCODONE    IR 10 milliGRAM(s) Oral every 4 hours PRN Severe Pain (7 - 10)      Allergies    No Known Allergies    Intolerances        LABS:                    CAPILLARY BLOOD GLUCOSE            RADIOLOGY & ADDITIONAL TESTS:    CXR:  < from: Xray Chest 1 View AP/PA (05.28.19 @ 18:22) >  Impression: No evidence for acute pulmonary infiltrate, pleural effusion,   or pneumothorax.     Stable cardiac silhouette.     No pulmonary vascular congestion.    Central retrocardiac density, suggestive of a hiatal hernia.      < end of copied text >    Ct scan chest:    ekg;    echo: Patient is a 69y old  Female who presents with a chief complaint of Left Ankle Fracture (02 Jun 2019 11:05)    Awake, alert, lying in bed in NAD. S/P ORIF of Left Ankle - POD #3. Still with some pain. No cough or SOB at this moment . Pt has no new complaints.     INTERVAL HPI/OVERNIGHT EVENTS:      VITAL SIGNS:  T(F): 99.3 (06-02-19 @ 05:50)  HR: 89 (06-02-19 @ 05:50)  BP: 135/57 (06-02-19 @ 05:50)  RR: 17 (06-02-19 @ 05:50)  SpO2: 95% (06-02-19 @ 05:50)  Wt(kg): --  I&O's Detail          REVIEW OF SYSTEMS:    CONSTITUTIONAL:  No fevers, chills, sweats    HEENT:  Eyes:  No diplopia or blurred vision. ENT:  No earache, sore throat or runny nose.    CARDIOVASCULAR:  No pressure, squeezing, tightness, or heaviness about the chest; no palpitations.    RESPIRATORY:  Per HPI    GASTROINTESTINAL:  No abdominal pain, nausea, vomiting or diarrhea.    GENITOURINARY:  No dysuria, frequency or urgency.    NEUROLOGIC:  No paresthesias, fasciculations, seizures or weakness.    PSYCHIATRIC:  No disorder of thought or mood.      PHYSICAL EXAM:    Constitutional: Well developed and nourished  Eyes:Perrla  ENMT: normal  Neck:supple  Respiratory: good air entry  Cardiovascular: S1 S2 regular  Gastrointestinal: Soft, Non tender  Extremities: + edema LLL dressing cdi.   Vascular:normal  Neurological:Awake, alert,Ox3  Musculoskeletal: WBAT per PT     MEDICATIONS  (STANDING):  carBAMazepine 200 milliGRAM(s) Oral three times a day  cetirizine 10 milliGRAM(s) Oral daily  chlorhexidine 2% Cloths 1 Application(s) Topical daily  dextrose 5% + sodium chloride 0.9%. 1000 milliLiter(s) (100 mL/Hr) IV Continuous <Continuous>  docusate sodium 100 milliGRAM(s) Oral three times a day  enalapril 10 milliGRAM(s) Oral two times a day  enoxaparin Injectable 40 milliGRAM(s) SubCutaneous every 24 hours  esOMEPRAZOLE 20 milliGRAM(s) Oral before breakfast  fluticasone propionate   110 MICROgram(s) HFA Inhaler 2 Puff(s) Inhalation two times a day  montelukast 10 milliGRAM(s) Oral at bedtime  sodium chloride 0.9%. 1000 milliLiter(s) (105 mL/Hr) IV Continuous <Continuous>    MEDICATIONS  (PRN):  acetaminophen   Tablet .. 325 milliGRAM(s) Oral every 4 hours PRN Mild Pain (1 - 3)  Xfqviwqzkpxjk409eo/Enmfrkdustiwkpu96.5mg 1 Tablet(s) 1 Tablet(s) Oral every 6 hours PRN mild pain  ALBUTerol    90 MICROgram(s) HFA Inhaler 2 Puff(s) Inhalation every 6 hours PRN Shortness of Breath and/or Wheezing  ondansetron Injectable 4 milliGRAM(s) IV Push every 6 hours PRN Nausea and/or Vomiting  oxyCODONE    IR 10 milliGRAM(s) Oral every 4 hours PRN Severe Pain (7 - 10)      Allergies    No Known Allergies    Intolerances        LABS:                    CAPILLARY BLOOD GLUCOSE            RADIOLOGY & ADDITIONAL TESTS:    CXR:  < from: Xray Chest 1 View AP/PA (05.28.19 @ 18:22) >  Impression: No evidence for acute pulmonary infiltrate, pleural effusion,   or pneumothorax.     Stable cardiac silhouette.     No pulmonary vascular congestion.    Central retrocardiac density, suggestive of a hiatal hernia.      < end of copied text >    Ct scan chest:    ekg;    echo:

## 2019-06-02 NOTE — DISCHARGE NOTE PROVIDER - NSDCACTIVITY_GEN_ALL_CORE
Stairs allowed/Walking - Indoors allowed/Walking - Outdoors allowed/Do not drive or operate machinery

## 2019-06-02 NOTE — PROGRESS NOTE ADULT - SUBJECTIVE AND OBJECTIVE BOX
Patient is a 69y old  Female who presents with a chief complaint of Left Ankle Fracture (01 Jun 2019 12:56)    pt seen in icu [  ], reg med floor [x   ], bed [ x ], chair at bedside [   ], a+o x3 [ x ], lethargic [  ],  nad [ x ]        Allergies    No Known Allergies        Vitals    T(F): 99.3 (06-02-19 @ 05:50), Max: 99.3 (06-02-19 @ 05:50)  HR: 89 (06-02-19 @ 05:50) (89 - 100)  BP: 135/57 (06-02-19 @ 05:50) (135/57 - 163/71)  RR: 17 (06-02-19 @ 05:50) (17 - 18)  SpO2: 95% (06-02-19 @ 05:50) (95% - 100%)  Wt(kg): --  CAPILLARY BLOOD GLUCOSE          Labs                          Radiology Results      Meds    MEDICATIONS  (STANDING):  carBAMazepine 200 milliGRAM(s) Oral three times a day  cetirizine 10 milliGRAM(s) Oral daily  chlorhexidine 2% Cloths 1 Application(s) Topical daily  dextrose 5% + sodium chloride 0.9%. 1000 milliLiter(s) (100 mL/Hr) IV Continuous <Continuous>  docusate sodium 100 milliGRAM(s) Oral three times a day  enalapril 10 milliGRAM(s) Oral two times a day  enoxaparin Injectable 40 milliGRAM(s) SubCutaneous every 24 hours  esOMEPRAZOLE 20 milliGRAM(s) Oral before breakfast  fluticasone propionate   110 MICROgram(s) HFA Inhaler 2 Puff(s) Inhalation two times a day  montelukast 10 milliGRAM(s) Oral at bedtime  sodium chloride 0.9%. 1000 milliLiter(s) (105 mL/Hr) IV Continuous <Continuous>      MEDICATIONS  (PRN):  acetaminophen   Tablet .. 325 milliGRAM(s) Oral every 4 hours PRN Mild Pain (1 - 3)  Kkwoqschsbphr832sf/Momwqvbdxpedybd53.5mg 1 Tablet(s) 1 Tablet(s) Oral every 6 hours PRN mild pain  ALBUTerol    90 MICROgram(s) HFA Inhaler 2 Puff(s) Inhalation every 6 hours PRN Shortness of Breath and/or Wheezing  ondansetron Injectable 4 milliGRAM(s) IV Push every 6 hours PRN Nausea and/or Vomiting  oxyCODONE    IR 10 milliGRAM(s) Oral every 4 hours PRN Severe Pain (7 - 10)      Physical Exam      Neuro :  no focal deficits  Respiratory: CTA B/L  CV: RRR, S1S2, no murmurs,   Abdominal: Soft, NT, ND +BS,  Extremities: No edema, + peripheral pulses, left leg splint        ASSESSMENT    left Closed displaced trimalleolar fracture  h/o Trigeminal neuralgia  Hypertension  asthma         PLAN      s/p Surgical orif   surgical f/u   pt to remain nwb of left leg with crutches   cont Pain control  cont DVT prophylaxis  pulm f/u  Bronchodilators  Symbicort 160mcg 2 Puff BID with spacer.   Singulair QHS.   PFTs as OP.  incentive spirometer  PT eval noted, recs rehab facility   cont current meds  d/c planning to [facility]

## 2019-06-02 NOTE — DISCHARGE NOTE PROVIDER - HOSPITAL COURSE
70 y/o F with Left Ankle Fracture who underwent an ORIF of Left Ankle Fracture on 5/30/2019 by Dr. Vicente Figueroa. Had uneventful hospital stay.     NWB to Left Leg, ambulate with crutches.    Aspiring 325mg PO BID for DVT prophylaxis for 1 month.

## 2019-06-02 NOTE — DISCHARGE NOTE PROVIDER - NSDCCPCAREPLAN_GEN_ALL_CORE_FT
PRINCIPAL DISCHARGE DIAGNOSIS  Diagnosis: Trimalleolar fracture of ankle, closed  Assessment and Plan of Treatment:

## 2019-06-02 NOTE — PROGRESS NOTE ADULT - PROVIDER SPECIALTY LIST ADULT
Internal Medicine
Orthopedics
Pulmonology
Internal Medicine
Orthopedics

## 2021-03-23 NOTE — DISCHARGE NOTE PROVIDER - NSDCCONDITION_GEN_ALL_CORE
Stable PRINCIPAL PROCEDURE  Procedure: Placement, seton stitch  Findings and Treatment: 3 seton stiches placed  WOUND CARE: Seton stiches will be removed in office outpatinet.    BATHING: Sitz baths daily   ACTIVITY: No heavy lifting anything more than 10-15lbs or straining. Otherwise, you may return to your usual level of physical activity. If you are taking narcotic pain medication (such as Percocet), do NOT drive a car, operate machinery or make important decisions.  DIET: Low fiber diet  NOTIFY YOUR SURGEON IF: You have any bleeding that does not stop, any pus draining from your wound, any fever (over 100.4 F) or chills, persistent nausea/vomiting with inability to tolerate food or liquids, persistent diarrhea, or if your pain is not controlled on your discharge pain medications.  FOLLOW-UP:  1. Please call to make a follow-up appointment with Dr. Webster within one week of discharge   2. Please follow up with your primary care physician in one week regarding your hospitalization.

## 2021-06-08 NOTE — PHYSICAL THERAPY INITIAL EVALUATION ADULT - DID THE PATIENT HAVE SURGERY?
Refill Approved    Rx renewed per Medication Renewal Policy. Medication was last renewed on 2/28/20.    Kary Cervantes, [facility] Triage/Med Refill 5/21/2020     Requested Prescriptions   Pending Prescriptions Disp Refills     sertraline (ZOLOFT) 50 MG tablet [Pharmacy Med Name: Sertraline HCl Oral Tablet 50 MG] 45 tablet 0     Sig: TAKE 1 &1/2 TABLETS (75 MG) BY MOUTH ONCE DAILY       [facility] Refill Protocol  Passed - 5/19/2020  7:00 AM        Passed - PCP or prescribing provider visit in last year     Last office visit with prescriber/PCP: 2/28/2020 Modesta Corrales MD OR same dept: 2/28/2020 Modesta Corrales MD OR same specialty: 2/28/2020 Modesta Corrales MD  Last physical: 3/26/2019 Last MTM visit: Visit date not found   Next visit within 3 mo: Visit date not found  Next physical within 3 mo: Visit date not found  Prescriber OR PCP: Modesta Corrales MD  Last diagnosis associated with med order: 1. Depression, unspecified depression type  - sertraline (ZOLOFT) 50 MG tablet [Pharmacy Med Name: Sertraline HCl Oral Tablet 50 MG]; TAKE 1 &1/2 TABLETS (75 MG) BY MOUTH ONCE DAILY  Dispense: 45 tablet; Refill: 0    If protocol passes may refill for 12 months if within 3 months of last provider visit (or a total of 15 months).                            
L Ankle ORIF/yes
yes/L ankle ORIF/short leg casting

## 2022-02-22 NOTE — ED ADULT NURSE NOTE - OBJECTIVE STATEMENT
no
Pt presented to ED c/o wheezing, difficulty breathing and coughing after taking prednisone and antibiotics already

## 2023-06-14 NOTE — ED ADULT TRIAGE NOTE - AS HEIGHT TYPE
Discussed with oncologist (Dr. Gilmore)  Patient diagnosed without biopsy but was presented at tumor board  Unable to tolerate more than a few sessions of chemotherapy/XRT  Although not considered stage IV she should quality for hospice as no further treatment is recommended currently due to her poor performance status.     stated

## 2023-12-27 NOTE — PATIENT PROFILE ADULT - NSPROPTRIGHTREPPHONE_GEN_A_NUR
Called and spoke to Travis.  Relayed below results and recommendations per HARVEY Ram.    Patient verbalized understanding and has no further questions at this time.    [phone]